# Patient Record
Sex: MALE | Race: WHITE | NOT HISPANIC OR LATINO
[De-identification: names, ages, dates, MRNs, and addresses within clinical notes are randomized per-mention and may not be internally consistent; named-entity substitution may affect disease eponyms.]

---

## 2019-03-13 PROBLEM — Z00.00 ENCOUNTER FOR PREVENTIVE HEALTH EXAMINATION: Status: ACTIVE | Noted: 2019-03-13

## 2019-03-15 ENCOUNTER — APPOINTMENT (OUTPATIENT)
Dept: SURGERY | Facility: CLINIC | Age: 74
End: 2019-03-15
Payer: MEDICARE

## 2019-03-15 VITALS
HEART RATE: 54 BPM | SYSTOLIC BLOOD PRESSURE: 122 MMHG | DIASTOLIC BLOOD PRESSURE: 71 MMHG | TEMPERATURE: 96.6 F | WEIGHT: 185.25 LBS | OXYGEN SATURATION: 96 % | HEIGHT: 72 IN | BODY MASS INDEX: 25.09 KG/M2

## 2019-03-15 DIAGNOSIS — I10 ESSENTIAL (PRIMARY) HYPERTENSION: ICD-10-CM

## 2019-03-15 DIAGNOSIS — R00.2 PALPITATIONS: ICD-10-CM

## 2019-03-15 DIAGNOSIS — E78.5 HYPERLIPIDEMIA, UNSPECIFIED: ICD-10-CM

## 2019-03-15 DIAGNOSIS — R73.03 PREDIABETES.: ICD-10-CM

## 2019-03-15 DIAGNOSIS — K40.90 UNILATERAL INGUINAL HERNIA, W/OUT OBSTRUCTION OR GANGRENE, NOT SPECIFIED AS RECURRENT: ICD-10-CM

## 2019-03-15 PROCEDURE — 99203 OFFICE O/P NEW LOW 30 MIN: CPT

## 2019-03-15 NOTE — HISTORY OF PRESENT ILLNESS
[de-identified] : Pt is a 73 y/o M with pmhx of prediabetes, htn, hld, who presents today feeling well. Pt states he was referred here by his PCP for b/l inguinal hernia evaluation. Pt states that he has had this problem for about 3 mo. States that he is not experiencing pain, denies changes in size or characteristic.

## 2019-03-15 NOTE — PLAN
[FreeTextEntry1] : Pt to be scheduled for b/l inguinal hernia repair\par Pt to obtain medical and cardiac clearance

## 2019-03-15 NOTE — PHYSICAL EXAM
[Normal] : affect appropriate [de-identified] : small-moderate b/l inguinal hernia, no ttp, no erythema

## 2019-05-03 VITALS
RESPIRATION RATE: 16 BRPM | SYSTOLIC BLOOD PRESSURE: 124 MMHG | DIASTOLIC BLOOD PRESSURE: 67 MMHG | WEIGHT: 188.05 LBS | HEIGHT: 72 IN | HEART RATE: 54 BPM | TEMPERATURE: 96 F | OXYGEN SATURATION: 99 %
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
## 2019-05-03 NOTE — ASU PATIENT PROFILE, ADULT - PSH
H/O shoulder surgery    History of surgery    History of surgery  fibula fracture surgery H/O shoulder surgery  left  History of surgery  fibula fracture surgery  History of tonsillectomy

## 2019-05-06 ENCOUNTER — APPOINTMENT (OUTPATIENT)
Age: 74
End: 2019-05-06

## 2019-05-06 ENCOUNTER — OUTPATIENT (OUTPATIENT)
Dept: OUTPATIENT SERVICES | Facility: HOSPITAL | Age: 74
LOS: 1 days | Discharge: HOME | End: 2019-05-06
Payer: MEDICARE

## 2019-05-06 VITALS
RESPIRATION RATE: 17 BRPM | DIASTOLIC BLOOD PRESSURE: 67 MMHG | SYSTOLIC BLOOD PRESSURE: 117 MMHG | OXYGEN SATURATION: 100 % | HEART RATE: 56 BPM

## 2019-05-06 DIAGNOSIS — Z90.89 ACQUIRED ABSENCE OF OTHER ORGANS: Chronic | ICD-10-CM

## 2019-05-06 DIAGNOSIS — Z98.890 OTHER SPECIFIED POSTPROCEDURAL STATES: Chronic | ICD-10-CM

## 2019-05-06 PROCEDURE — C1781: CPT

## 2019-05-06 PROCEDURE — 49650 LAP ING HERNIA REPAIR INIT: CPT | Mod: 50,GC

## 2019-05-06 PROCEDURE — S2900: CPT

## 2019-05-06 PROCEDURE — 49650 LAP ING HERNIA REPAIR INIT: CPT | Mod: 50

## 2019-05-06 PROCEDURE — 49650 LAP ING HERNIA REPAIR INIT: CPT | Mod: 80,50

## 2019-05-06 RX ORDER — SODIUM CHLORIDE 9 MG/ML
3 INJECTION INTRAMUSCULAR; INTRAVENOUS; SUBCUTANEOUS EVERY 8 HOURS
Qty: 0 | Refills: 0 | Status: DISCONTINUED | OUTPATIENT
Start: 2019-05-06 | End: 2019-05-06

## 2019-05-06 RX ORDER — BUPIVACAINE 13.3 MG/ML
20 INJECTION, SUSPENSION, LIPOSOMAL INFILTRATION ONCE
Qty: 0 | Refills: 0 | Status: DISCONTINUED | OUTPATIENT
Start: 2019-05-06 | End: 2019-05-06

## 2019-05-06 RX ORDER — SENNA PLUS 8.6 MG/1
1 TABLET ORAL
Qty: 10 | Refills: 0 | OUTPATIENT
Start: 2019-05-06

## 2019-05-06 RX ORDER — OXYCODONE HYDROCHLORIDE 5 MG/1
5 TABLET ORAL ONCE
Qty: 0 | Refills: 0 | Status: DISCONTINUED | OUTPATIENT
Start: 2019-05-06 | End: 2019-05-06

## 2019-05-06 RX ORDER — FENTANYL CITRATE 50 UG/ML
25 INJECTION INTRAVENOUS
Qty: 0 | Refills: 0 | Status: DISCONTINUED | OUTPATIENT
Start: 2019-05-06 | End: 2019-05-06

## 2019-05-06 RX ORDER — DOCUSATE SODIUM 100 MG
1 CAPSULE ORAL
Qty: 20 | Refills: 0 | OUTPATIENT
Start: 2019-05-06

## 2019-05-06 NOTE — BRIEF OPERATIVE NOTE - NSICDXBRIEFPROCEDURE_GEN_ALL_CORE_FT
PROCEDURES:  Robot-assisted repair of inguinal hernia using da Jose Alfredo Xi 06-May-2019 15:00:04 bilateral   Refugio Yeboah

## 2019-05-06 NOTE — PACU DISCHARGE NOTE - COMMENTS
alert and oriented x4- tolerating po intake well. ambulating to bathroom and hallway from chair with stand by assist- pt denies lightheadedness. Voided 100ml of urine- Team notified and assessed pt tat bedside and per Team-pt may go at present- Discharge instruction explained to pt. Pt verbalizes understanding-pt left unit ambulating to main lobby accompanied by PCA alert and oriented x4- tolerating po intake well. ambulating to bathroom and hallway from chair with stand by assist- pt denies lightheadedness. Voided 100ml of urine- Team notified and assessed pt tat bedside and per Team 2-pt may go at present- Discharge instruction explained to pt. Pt verbalizes understanding-pt left unit ambulating to main lobby accompanied by PCA

## 2019-05-06 NOTE — BRIEF OPERATIVE NOTE - OPERATION/FINDINGS
Emperatriz access, visible right pantaloon and right direct inguinal hernia, reduced primarily with placement of 3d Max mesh in bilateral spaces. Peritoneum closed primarily. All defects covered. Umbilical incision closed primarily with 0 figure of 8 vicryl.

## 2019-05-09 PROBLEM — N40.0 BENIGN PROSTATIC HYPERPLASIA WITHOUT LOWER URINARY TRACT SYMPTOMS: Chronic | Status: ACTIVE | Noted: 2019-05-06

## 2019-05-09 PROBLEM — M72.2 PLANTAR FASCIAL FIBROMATOSIS: Chronic | Status: ACTIVE | Noted: 2019-05-03

## 2019-05-09 PROBLEM — I10 ESSENTIAL (PRIMARY) HYPERTENSION: Chronic | Status: ACTIVE | Noted: 2019-05-03

## 2019-05-09 PROBLEM — K29.70 GASTRITIS, UNSPECIFIED, WITHOUT BLEEDING: Chronic | Status: ACTIVE | Noted: 2019-05-03

## 2019-05-09 PROBLEM — S32.9XXA FRACTURE OF UNSPECIFIED PARTS OF LUMBOSACRAL SPINE AND PELVIS, INITIAL ENCOUNTER FOR CLOSED FRACTURE: Chronic | Status: ACTIVE | Noted: 2019-05-03

## 2019-05-09 PROBLEM — E78.5 HYPERLIPIDEMIA, UNSPECIFIED: Chronic | Status: ACTIVE | Noted: 2019-05-06

## 2019-05-10 ENCOUNTER — APPOINTMENT (OUTPATIENT)
Dept: SURGERY | Facility: CLINIC | Age: 74
End: 2019-05-10
Payer: MEDICARE

## 2019-05-10 VITALS
HEIGHT: 72 IN | SYSTOLIC BLOOD PRESSURE: 151 MMHG | WEIGHT: 187.5 LBS | DIASTOLIC BLOOD PRESSURE: 80 MMHG | BODY MASS INDEX: 25.4 KG/M2 | TEMPERATURE: 97.1 F | HEART RATE: 60 BPM | OXYGEN SATURATION: 97 %

## 2019-05-10 PROCEDURE — 99024 POSTOP FOLLOW-UP VISIT: CPT

## 2019-05-10 NOTE — HISTORY OF PRESENT ILLNESS
[de-identified] : 75 yo M 5 days s/p b/l hernia repair. Pt reports feeling well. Incisions also healing well. Drain was removed in office. Denies any fever or chills. He states that he has been urinating frequently with normal amounts of urine and feels bloated after meals. He does add that recently it has been improving over the past few days. No other complaints today.

## 2019-05-10 NOTE — ASSESSMENT
[FreeTextEntry1] : 75 yo M 5 days s/p b/l hernia repair. Pt is doing well. Incisions are healing well. Drain removed without complications. Pt advised to see his urologist if his frequent urination becomes a problem. Follow up in 3 weeks.

## 2019-05-31 ENCOUNTER — APPOINTMENT (OUTPATIENT)
Dept: SURGERY | Facility: CLINIC | Age: 74
End: 2019-05-31
Payer: MEDICARE

## 2019-05-31 VITALS
DIASTOLIC BLOOD PRESSURE: 76 MMHG | BODY MASS INDEX: 25.87 KG/M2 | WEIGHT: 191 LBS | SYSTOLIC BLOOD PRESSURE: 133 MMHG | HEIGHT: 72 IN | TEMPERATURE: 97.8 F | OXYGEN SATURATION: 98 % | HEART RATE: 54 BPM

## 2019-05-31 PROCEDURE — 99024 POSTOP FOLLOW-UP VISIT: CPT

## 2019-05-31 NOTE — ASSESSMENT
[FreeTextEntry1] : 75 yo M 26 days s/p b/l hernia repair. Patient is doing well. Encouraged patient to stretch and walk often to increase range of motion on his legs. Advised patient to begin strength training slowly and gradually increasing weights he lifts over time. Follow up here as needed.

## 2019-05-31 NOTE — ADDENDUM
[FreeTextEntry1] : Documented by Marilynn Jackson acting as a scribe for Dr. Marc Waddell on 05/31/2019\par

## 2019-05-31 NOTE — HISTORY OF PRESENT ILLNESS
[de-identified] : 75 yo M 26 days s/p b/l hernia repair. Pt reports feeling well. He does report presence of some lumps,swelling, as well as itchiness around the incision site. Pt looking to begin strength training exercise. No other complaints today.

## 2019-05-31 NOTE — END OF VISIT
[FreeTextEntry3] : All medical record entries made by the Scribe were at my, Dr. Waddell's direction and personally dictated by me on 05/31/2019  I have reviewed the chart and agree that the record accurately reflects my personal performance of the history, physical exam, assessment and plan. I have also personally directed, reviewed, and agreed with the chart.\par \par

## 2020-02-19 ENCOUNTER — APPOINTMENT (OUTPATIENT)
Dept: ORTHOPEDIC SURGERY | Facility: CLINIC | Age: 75
End: 2020-02-19
Payer: MEDICARE

## 2020-02-19 DIAGNOSIS — Z86.79 PERSONAL HISTORY OF OTHER DISEASES OF THE CIRCULATORY SYSTEM: ICD-10-CM

## 2020-02-19 DIAGNOSIS — M25.512 PAIN IN LEFT SHOULDER: ICD-10-CM

## 2020-02-19 DIAGNOSIS — S49.92XA UNSPECIFIED INJURY OF LEFT SHOULDER AND UPPER ARM, INITIAL ENCOUNTER: ICD-10-CM

## 2020-02-19 PROCEDURE — 20611 DRAIN/INJ JOINT/BURSA W/US: CPT | Mod: LT

## 2020-02-19 PROCEDURE — 99204 OFFICE O/P NEW MOD 45 MIN: CPT | Mod: 25

## 2020-02-19 PROCEDURE — 76881 US COMPL JOINT R-T W/IMG: CPT | Mod: 59,LT

## 2020-02-19 RX ORDER — TRAMADOL HYDROCHLORIDE 50 MG/1
50 TABLET, COATED ORAL
Qty: 60 | Refills: 0 | Status: ACTIVE | COMMUNITY
Start: 2020-02-19 | End: 1900-01-01

## 2020-02-19 NOTE — HISTORY OF PRESENT ILLNESS
[de-identified] : LEFT SHOULDER\par 2/17/20- FALL\par PREVIOUS LEFT SHOULDER SURGERY 30 YEARS AGO\par 1980s - LEFT SHOULDER OPEN SURGERY WITH STAPLE \par PAIN LEVEL 6/10\par CONSTANT PAIN\par DULL\par BETTER WITH HEAT, IBUPROFEN\par WORSE WITH LIFTING REACHING ACROSS AND BEHIND, LYING DOWN\par STIFFNESS\par GRINDING\par RADIATING DOWN ARM\par WEAKNESS

## 2020-02-19 NOTE — PROCEDURE
[de-identified] : DIAGNOSTIC ULTRASOUND LEFT SHOULDER\par \par DIAGNOSTIC SONOGRAPHY of the Rotator Cuff Soft Tissue of the LEFT  SHOULDER was performed in Multiple Scan Planes with varying transducer frequencies.\par Imaging of the Supraspinatus Tendon reveals TENDONITIS, BURSITIS, ARTICULAR SIDE DEGENERATION  WITH OUT  COMPLETE TEAR\par Imaging of the Biceps Tendon reveals no significant tear.\par Imaging of the Subscapularis Tendon reveals no significant tear.\par Imaging of the Infraspinatus Tendon reveals no significant tear.\par Key images were save digitally and reviewed with patient.\par \par \par \par INJECTION LEFT SHOULDER SA SPACE\par \par Patient has demonstrated limited relief from NSAIDS, rest, exercises / PT, and after discussion of the risks and benefits, the patient has elected to proceed with an ULTRASOUND GUIDED injection into the LEFT SUBACROMIAL  SPACE LATERAL APPROACH \par  \par Confirmed that the patient does not have history of prior adverse reactions, active, infections, or relevant allergies. There was no effusion, erythema, or warmth, and the skin was clear\par \par The skin was sterilized with alcohol. Ethyl Chloride was used as a topical anesthetic. Routine sterile technique. \par The site was injected UTILIZING ULTRASOUND GUIDANCE to confirm appropriate placement of the needle-\par with a mixture of medication and local anesthetic. The injection was completed without complication and a bandage was applied.\par  \par The patient tolerated the procedure well and was given post-injection instructions.Rec: Cold therapy, analgesics, avoid heavy activity.\par MEDICATION: 4cc of 1% xylocaine + 10mg of KENALOG\par \par

## 2020-02-19 NOTE — DISCUSSION/SUMMARY
[de-identified] : POST INJECTION INSTRUCTIONS\par \par COLD THERAPY , ANALGESICS PRN\par \par HOUSEHOLD ACTIVITIES AND LIGHT WORK\par \par FU AFTER MRI\par

## 2020-02-19 NOTE — PHYSICAL EXAM
[de-identified] : PHYSICAL EXAM LEFT  SHOULDER\par \par SCAPULAR PROTRACTION\par AROM 40 / 40 \par TENDER: SA REGION \par \par SPECIAL TESTING :\par NGUYEN - POSITIVE \par COLLETTE - POSITIVE \par SPEED TEST - POSITIVE\par \par BOYLE - NEGATIVE \par APPREHENSION AND SUPPRESSION - NEGATIVE \par \par RC STRENGTH TESTING \par SS:  5/5\par SUB 5/5\par IS     5/5\par BICEPS  5/5\par \par SENSATION  - GROSSLY INTACT\par \par \par  [de-identified] : outside films \par LEFT SHOULDER XRAY -  \par NO OBVIOUS FRACTURE , SEPARATION OR DISLOCATION \par GRADE 3 OSTEOARTHRITIS, \par TYPE 2B ACROMION \par \par

## 2020-04-15 ENCOUNTER — APPOINTMENT (OUTPATIENT)
Dept: ORTHOPEDIC SURGERY | Facility: CLINIC | Age: 75
End: 2020-04-15

## 2023-02-10 NOTE — ASU PATIENT PROFILE, ADULT - MEDICATION ADMINISTRATION INFO, PROFILE
Moderate episode of recurrent major depressive disorder (HCC)Noted 12/29/2022  [F33 1]      NOT on the BPA- please assess using MEAT for 2023 billing    [facility] [address]  coding opportunities          Chart Reviewed number of suggestions sent to Provider: 1     Patients Insurance        Commercial Insurance: [employer]
no concerns

## 2023-06-26 LAB
ANION GAP IN SER/PLAS: 13 MMOL/L (ref 10–20)
CALCIUM (MG/DL) IN SER/PLAS: 9.8 MG/DL (ref 8.6–10.3)
CARBON DIOXIDE, TOTAL (MMOL/L) IN SER/PLAS: 27 MMOL/L (ref 21–32)
CHLORIDE (MMOL/L) IN SER/PLAS: 101 MMOL/L (ref 98–107)
CREATININE (MG/DL) IN SER/PLAS: 1.2 MG/DL (ref 0.5–1.3)
ERYTHROCYTE DISTRIBUTION WIDTH (RATIO) BY AUTOMATED COUNT: 13.8 % (ref 11.5–14.5)
ERYTHROCYTE MEAN CORPUSCULAR HEMOGLOBIN CONCENTRATION (G/DL) BY AUTOMATED: 33.3 G/DL (ref 32–36)
ERYTHROCYTE MEAN CORPUSCULAR VOLUME (FL) BY AUTOMATED COUNT: 94 FL (ref 80–100)
ERYTHROCYTES (10*6/UL) IN BLOOD BY AUTOMATED COUNT: 4.81 X10E12/L (ref 4.5–5.9)
GFR MALE: 63 ML/MIN/1.73M2
GLUCOSE (MG/DL) IN SER/PLAS: 133 MG/DL (ref 74–99)
HEMATOCRIT (%) IN BLOOD BY AUTOMATED COUNT: 45.1 % (ref 41–52)
HEMOGLOBIN (G/DL) IN BLOOD: 15 G/DL (ref 13.5–17.5)
LEUKOCYTES (10*3/UL) IN BLOOD BY AUTOMATED COUNT: 5.6 X10E9/L (ref 4.4–11.3)
PLATELETS (10*3/UL) IN BLOOD AUTOMATED COUNT: 201 X10E9/L (ref 150–450)
POTASSIUM (MMOL/L) IN SER/PLAS: 4.4 MMOL/L (ref 3.5–5.3)
SODIUM (MMOL/L) IN SER/PLAS: 137 MMOL/L (ref 136–145)
UREA NITROGEN (MG/DL) IN SER/PLAS: 11 MG/DL (ref 6–23)

## 2023-12-06 DIAGNOSIS — G62.9 NEUROPATHY: Primary | ICD-10-CM

## 2023-12-06 RX ORDER — GABAPENTIN 300 MG/1
300 CAPSULE ORAL 2 TIMES DAILY
COMMUNITY
Start: 2014-11-07 | End: 2023-12-06 | Stop reason: SDUPTHER

## 2023-12-06 RX ORDER — GABAPENTIN 300 MG/1
300 CAPSULE ORAL 2 TIMES DAILY
Qty: 180 CAPSULE | Refills: 0 | Status: SHIPPED | OUTPATIENT
Start: 2023-12-06 | End: 2024-02-28

## 2023-12-15 ENCOUNTER — OFFICE VISIT (OUTPATIENT)
Dept: PRIMARY CARE | Facility: CLINIC | Age: 78
End: 2023-12-15
Payer: MEDICARE

## 2023-12-15 VITALS
RESPIRATION RATE: 16 BRPM | TEMPERATURE: 97.5 F | HEART RATE: 96 BPM | DIASTOLIC BLOOD PRESSURE: 80 MMHG | OXYGEN SATURATION: 98 % | SYSTOLIC BLOOD PRESSURE: 122 MMHG

## 2023-12-15 DIAGNOSIS — H34.232 RETINAL ARTERY BRANCH OCCLUSION OF LEFT EYE: Primary | ICD-10-CM

## 2023-12-15 PROCEDURE — 1159F MED LIST DOCD IN RCRD: CPT | Performed by: FAMILY MEDICINE

## 2023-12-15 PROCEDURE — 99213 OFFICE O/P EST LOW 20 MIN: CPT | Performed by: FAMILY MEDICINE

## 2023-12-15 RX ORDER — SILDENAFIL 100 MG/1
100 TABLET, FILM COATED ORAL DAILY PRN
COMMUNITY
Start: 2019-06-03

## 2023-12-15 RX ORDER — ASPIRIN 81 MG/1
1 TABLET ORAL DAILY
COMMUNITY
Start: 2021-10-01

## 2023-12-15 RX ORDER — LEVOTHYROXINE SODIUM 25 UG/1
25 TABLET ORAL DAILY
COMMUNITY
Start: 2016-01-25

## 2023-12-15 RX ORDER — LAMOTRIGINE 25 MG/1
50 TABLET, CHEWABLE ORAL 2 TIMES DAILY
COMMUNITY

## 2023-12-15 NOTE — PROGRESS NOTES
Subjective   Patient ID: Oscar Lomeli is a 78 y.o. male who presents for Eye Problem.  HPI  Patient presenting for follow up stating danielle the saw an eye specilaist and they watned to see his PCP   He is not sure who is saw and he forgot the paper at home.   When he got the paper it was soraya hamilton to go to the ED for retinal artery occulsion.     Review of Systems    No past medical history on file.    No past surgical history on file.   Social History     Socioeconomic History    Marital status:      Spouse name: Not on file    Number of children: Not on file    Years of education: Not on file    Highest education level: Not on file   Occupational History    Not on file   Tobacco Use    Smoking status: Not on file    Smokeless tobacco: Not on file   Substance and Sexual Activity    Alcohol use: Not on file    Drug use: Not on file    Sexual activity: Not on file   Other Topics Concern    Not on file   Social History Narrative    Not on file     Social Determinants of Health     Financial Resource Strain: Not on file   Food Insecurity: Not on file   Transportation Needs: Not on file   Physical Activity: Not on file   Stress: Not on file   Social Connections: Not on file   Intimate Partner Violence: Not on file   Housing Stability: Not on file      No family history on file.    MEDICATIONS AND ALLERGIES:    ALLERGIES Patient has no known allergies.    MEDICATIONS   Current Outpatient Medications on File Prior to Visit   Medication Sig Dispense Refill    aspirin 81 mg EC tablet Take 1 tablet (81 mg) by mouth once daily.      levothyroxine (Synthroid, Levoxyl) 25 mcg tablet Take 1 tablet (25 mcg) by mouth once daily.      sildenafil (Viagra) 100 mg tablet Take 1 tablet (100 mg) by mouth once daily as needed.      gabapentin (Neurontin) 300 mg capsule Take 1 capsule (300 mg) by mouth 2 times a day. 180 capsule 0    lamoTRIgine (LaMICtal) 25 mg chewable tablet Take 2 tablets (50 mg) by mouth 2 times a day.       omeprazole (PriLOSEC) 20 mg DR capsule TAKE 1 CAPSULE BY MOUTH EVERY DAY 90 capsule 0     No current facility-administered medications on file prior to visit.        Objective   Visit Vitals  /80   Pulse 96   Temp 36.4 °C (97.5 °F)   Resp 16   SpO2 98%      Physical Exam  Constitutional:       Appearance: Normal appearance.   HENT:      Head: Normocephalic and atraumatic.   Eyes:      Pupils: Pupils are equal, round, and reactive to light.   Cardiovascular:      Rate and Rhythm: Normal rate.   Pulmonary:      Effort: Pulmonary effort is normal.   Musculoskeletal:         General: No swelling.      Cervical back: Normal range of motion.   Skin:     Coloration: Skin is not jaundiced.   Neurological:      General: No focal deficit present.      Mental Status: He is alert and oriented to person, place, and time.         1. 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

## 2023-12-21 ENCOUNTER — PATIENT OUTREACH (OUTPATIENT)
Dept: CARE COORDINATION | Facility: CLINIC | Age: 78
End: 2023-12-21
Payer: MEDICARE

## 2023-12-21 NOTE — PROGRESS NOTES
Discharge Facility:[facility]   Discharge Diagnosis:Non-Traumatic subacute subdural hemorrhage  Admission Date:12/18/2023  Discharge Date: 12/19/2023    PCP Appointment Date:TBD  Specialist Appointment Date: 1/17/2024/Cardiology  Hospital Encounter and Summary:  not available at this time   See discharge assessment below for further details  Engagement  Call Start Time: 1720 (12/21/2023 11:19 AM)    Medications  Medications reviewed with patient/caregiver?: Yes (No medication change) (12/21/2023 11:19 AM)  Is the patient having any side effects they believe may be caused by any medication additions or changes?: No (12/21/2023 11:19 AM)  Does the patient have all medications ordered at discharge?: Not applicable (12/21/2023 11:19 AM)  Care Management Interventions: No intervention needed (12/21/2023 11:19 AM)  Is the patient taking all medications as directed (includes completed medication regime)?: Yes (12/21/2023 11:19 AM)    Appointments  Does the patient have a primary care provider?: Yes (12/21/2023 11:19 AM)  Care Management Interventions: -- (Patient states that he will make the follow up appointment with pcp.) (12/21/2023 11:19 AM)  Has the patient kept scheduled appointments due by today?: Yes (12/21/2023 11:19 AM)    Patient Teaching  Does the patient have access to their discharge instructions?: Yes (12/21/2023 11:19 AM)  What is the patient's perception of their health status since discharge?: Improving (Oscar states that he is doing well, no concerns.) (12/21/2023 11:19 AM)  Is the patient/caregiver able to teach back the hierarchy of who to call/visit for symptoms/problems? PCP, Specialist, Home Health nurse, Urgent Care, ED, 911: Yes (12/21/2023 11:19 AM)    Wrap Up  Wrap Up Additional Comments: -- (Oscar says he is doing ok, no headaches or symptoms. He will fu with pcp and cardio.) (12/21/2023 11:19 AM)

## 2023-12-22 ENCOUNTER — OFFICE VISIT (OUTPATIENT)
Dept: PRIMARY CARE | Facility: CLINIC | Age: 78
End: 2023-12-22
Payer: MEDICARE

## 2023-12-22 VITALS
SYSTOLIC BLOOD PRESSURE: 122 MMHG | HEIGHT: 66 IN | HEART RATE: 70 BPM | DIASTOLIC BLOOD PRESSURE: 80 MMHG | TEMPERATURE: 97.8 F | WEIGHT: 176 LBS | BODY MASS INDEX: 28.28 KG/M2

## 2023-12-22 DIAGNOSIS — F10.10 ALCOHOL ABUSE: ICD-10-CM

## 2023-12-22 DIAGNOSIS — H34.232 RETINAL ARTERY BRANCH OCCLUSION OF LEFT EYE: Primary | ICD-10-CM

## 2023-12-22 DIAGNOSIS — I63.81 LACUNAR INFARCT, ACUTE (MULTI): ICD-10-CM

## 2023-12-22 DIAGNOSIS — J44.9 CHRONIC OBSTRUCTIVE PULMONARY DISEASE, UNSPECIFIED COPD TYPE (MULTI): ICD-10-CM

## 2023-12-22 PROCEDURE — 1036F TOBACCO NON-USER: CPT | Performed by: INTERNAL MEDICINE

## 2023-12-22 PROCEDURE — 1159F MED LIST DOCD IN RCRD: CPT | Performed by: INTERNAL MEDICINE

## 2023-12-22 PROCEDURE — 99496 TRANSJ CARE MGMT HIGH F2F 7D: CPT | Performed by: INTERNAL MEDICINE

## 2023-12-22 RX ORDER — ATORVASTATIN CALCIUM 80 MG/1
40 TABLET, FILM COATED ORAL EVERY EVENING
COMMUNITY
Start: 2023-12-19

## 2023-12-22 RX ORDER — LOSARTAN POTASSIUM 25 MG/1
25 TABLET ORAL DAILY
COMMUNITY
Start: 2022-02-25 | End: 2023-12-22 | Stop reason: ALTCHOICE

## 2023-12-22 RX ORDER — FLUTICASONE PROPIONATE AND SALMETEROL XINAFOATE 230; 21 UG/1; UG/1
2 AEROSOL, METERED RESPIRATORY (INHALATION) EVERY 12 HOURS
COMMUNITY
Start: 2017-06-12

## 2023-12-22 RX ORDER — MELOXICAM 15 MG/1
15 TABLET ORAL EVERY 24 HOURS
COMMUNITY
Start: 2016-12-16

## 2023-12-22 RX ORDER — CLOPIDOGREL BISULFATE 75 MG/1
75 TABLET ORAL DAILY
COMMUNITY
Start: 2023-12-19

## 2023-12-22 RX ORDER — MONTELUKAST SODIUM 10 MG/1
10 TABLET ORAL EVERY 24 HOURS
COMMUNITY
Start: 2017-09-13 | End: 2023-12-22 | Stop reason: ALTCHOICE

## 2023-12-22 NOTE — PROGRESS NOTES
"Subjective   Patient ID: Oscar Lomeli is a 78 y.o. male who presents for Hospital Follow-up (Hospital follow up, stroke ).    HPI TCM  SP CVA   DOING OK AT HOME   NO FURTHER SYMPTOMS  FEELS WELL   WE DID REVIEW CHART   MEDS   TESTING     Review of Systems  COPD  \ETOH ABUSE   HTN     Objective   /80   Pulse 70   Temp 36.6 °C (97.8 °F)   Ht 1.676 m (5' 6\")   Wt 79.8 kg (176 lb)   BMI 28.41 kg/m²     Physical Exam  Constitutional:       Appearance: Normal appearance. He is normal weight.   HENT:      Head: Normocephalic.      Right Ear: External ear normal.      Left Ear: External ear normal.      Nose: Nose normal.   Cardiovascular:      Rate and Rhythm: Normal rate and regular rhythm.      Pulses: Normal pulses.   Pulmonary:      Effort: Pulmonary effort is normal.      Breath sounds: Normal breath sounds.   Musculoskeletal:      Cervical back: Normal range of motion.   Neurological:      Mental Status: He is alert.         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

## 2024-01-08 ENCOUNTER — PATIENT OUTREACH (OUTPATIENT)
Dept: PRIMARY CARE | Facility: CLINIC | Age: 79
End: 2024-01-08
Payer: MEDICARE

## 2024-01-08 RX ORDER — GUAIFENESIN/DEXTROMETHORPHAN 100-10MG/5
5 SYRUP ORAL 4 TIMES DAILY PRN
COMMUNITY

## 2024-01-08 RX ORDER — FOLIC ACID 1 MG/1
1 TABLET ORAL DAILY
COMMUNITY

## 2024-01-08 RX ORDER — LANOLIN ALCOHOL/MO/W.PET/CERES
100 CREAM (GRAM) TOPICAL DAILY
COMMUNITY

## 2024-01-08 NOTE — PROGRESS NOTES
[facility] Facility:[facility]  Discharge Diagnosis:Dizziness  Admission Date:12/29/2023  Discharge Date: 1/6/2024    PCP Appointment Date:2/12/2024, for MCR, declined sooner  Specialist Appointment Date: 1/17/2024 Cardiology  Hospital Encounter and Summary:  not available at this time  See discharge assessment below for further details  Engagement  Call Start Time: 1455 (1/8/2024  2:56 PM)    Medications  Medications reviewed with patient/caregiver?: Yes (Plavix 75 mg one qd, Atorvastatin 40 mg one q hs, Folic Acid 1 mg one qd, Robitussin /10/5 ml  5ml q4 hours prn, Thiamine 100 mg one qd) (1/8/2024  2:56 PM)  Is the patient having any side effects they believe may be caused by any medication additions or changes?: No (1/8/2024  2:56 PM)  Does the patient have all medications ordered at discharge?: Yes (1/8/2024  2:56 PM)  Care Management Interventions: No intervention needed (1/8/2024  2:56 PM)  Is the patient taking all medications as directed (includes completed medication regime)?: Yes (1/8/2024  2:56 PM)    Appointments  Does the patient have a primary care provider?: Yes (1/8/2024  2:56 PM)  Care Management Interventions: -- (Has apppointment 2/12/2024, states will keep that.) (1/8/2024  2:56 PM)  Has the patient kept scheduled appointments due by today?: Yes (1/8/2024  2:56 PM)    Patient Teaching  Does the patient have access to their discharge instructions?: Yes (1/8/2024  2:56 PM)  What is the patient's perception of their health status since discharge?: Improving (1/8/2024  2:56 PM)  Is the patient/caregiver able to teach back the hierarchy of who to call/visit for symptoms/problems? PCP, Specialist, Home Health nurse, Urgent Care, ED, 911: Yes (1/8/2024  2:56 PM)    Wrap Up  Wrap Up Additional Comments: -- (Oscar states he is doing well. Encouraged to schedule sooner with PCP , states has other appts so will keep. He is taking meds as directed and will contact provider if any concerns.)  (1/8/2024  2:56 PM)

## 2024-01-11 ENCOUNTER — APPOINTMENT (OUTPATIENT)
Dept: ORTHOPEDIC SURGERY | Facility: CLINIC | Age: 79
End: 2024-01-11
Payer: MEDICARE

## 2024-01-11 PROCEDURE — 99203 OFFICE O/P NEW LOW 30 MIN: CPT

## 2024-01-11 NOTE — PHYSICAL EXAM
[de-identified] : PHYSICAL EXAM LEFT  SHOULDER  NECK EXAM  FROM NONTENDER  SPURLING  RIGHT=NEG, LEFT=NEG  NORMAL POSTURE  AROM 105  / 95 / 70 / 0 TENDER: GH JOINT  SPECIAL TESTING : NGUYEN - POSITIVE  COLLETTE - POSITIVE  SPEED TEST - POSITIVE  BOYLE - NEGATIVE  APPREHENSION AND SUPPRESSION - NEGATIVE   RC STRENGTH TESTING  SS:  5/5 SUB 5/5 IS     5/5 BICEPS  5/5  SENSATION  - GROSSLY INTACT   [de-identified] : Date of Exam: 03- EXAM:  MRI LEFT SHOULDER WITHOUT CONTRAST  IMPRESSION:  1. Severe glenohumeral arthrosis. 2. A large osteophyte which projects from the inferior humeral articular margin of the axillary pouch appears to have a nondisplaced fracture at its base. This may be the source for the patient's posttraumatic pain. 2. No other fractures are seen. 3. Mild to moderate AC joint arthrosis. 4. Study negative for significant subacromial enthesophyte formation. 5. Study negative for partial or full-thickness rotator cuff tear. 6. Circumferential degeneration and fraying of the glenoid labrum in this patient with severe glenohumeral arthrosis.

## 2024-01-11 NOTE — HISTORY OF PRESENT ILLNESS
[de-identified] : LEFT SHOULDER PAIN  FOLLOW UP  PAIN LEVEL: 2/10 , AT REST  PREVIOUS CORTISONE INJ-HELPFUL FOR 2 WEEKS PT CONTINUED DOING P.T  1980S - LEFT SHOULDER OPEN SURGERY WITH STAPLES PRIOR STUDES: MRI FROM 03/06/2020    PREVIOUS HPI LEFT SHOULDER  2/17/20- FALL  PREVIOUS LEFT SHOULDER SURGERY 30 YEARS AGO  1980s - LEFT SHOULDER OPEN SURGERY WITH STAPLE  PAIN LEVEL 6/10  CONSTANT PAIN  DULL  BETTER WITH HEAT, IBUPROFEN  WORSE WITH LIFTING REACHING ACROSS AND BEHIND, LYING DOWN  STIFFNESS  GRINDING  RADIATING DOWN ARM  WEAKNESS

## 2024-02-01 ENCOUNTER — APPOINTMENT (OUTPATIENT)
Dept: ORTHOPEDIC SURGERY | Facility: CLINIC | Age: 79
End: 2024-02-01
Payer: MEDICARE

## 2024-02-01 PROCEDURE — 99213 OFFICE O/P EST LOW 20 MIN: CPT

## 2024-02-01 NOTE — HISTORY OF PRESENT ILLNESS
[de-identified] : LEFT SHOULDER  FOLLOW UP CT RESULTS   PREVIOUS HPI LEFT SHOULDER 2/17/20- FALL PREVIOUS LEFT SHOULDER SURGERY 30 YEARS AGO 1980s - LEFT SHOULDER OPEN SURGERY WITH STAPLE PAIN LEVEL 6/10 CONSTANT PAIN DULL BETTER WITH HEAT, IBUPROFEN WORSE WITH LIFTING REACHING ACROSS AND BEHIND, LYING DOWN STIFFNESS GRINDING RADIATING DOWN ARM WEAKNESS LEFT SHOULDER PAIN  FOLLOW UP  PAIN LEVEL: 2/10 , AT REST  PREVIOUS CORTISONE INJ-HELPFUL FOR 2 WEEKS PT CONTINUED DOING P.T  1980S - LEFT SHOULDER OPEN SURGERY WITH STAPLES PRIOR STUDES: MRI FROM 03/06/2020

## 2024-02-01 NOTE — DISCUSSION/SUMMARY
[de-identified] : PREOP SHOULDER SURGERY DISCUSSION:  PROCEDURE DISCUSSED - QUESTIONS ANSWERED PATIENT WISHES TO PROCEED  POST OP CARE AND LIMITATIONS REVIEWED - HANDOUT PROVIDED   COLD PACKS RECOMMENDED ANALGESICS AND  ANTI NAUSEA MEDS PRESCRIBED  COLACE RECOMMENDED   THERE ARE NO GUARANTEES THAT ALL SYMPTOMS WILL BE ALLEVIATED   SHOULDER ARTHROSCOPY, ACROMIOPLASTY, DEBRIDEMENT,  RC REPAIR AND LABRUM REPAIRS- ON AVERAGE 75- 85% SATISFACTORY RESULTS FOR TEARS < 3CM AFTER 9-12 MONTHS HEALING AND REHABILITATION.   REPAIRS WILL REQUIRE STRICT SHOULDER IMMOBILIZER 4-6 WEEKS  RC TEARS 3CM OR LARGER MAY REQUIRE COLLAGEN PATCH AUGMENTATION GENERALLY HAVE LESS SATISFACTORY RESULTS  PHYSICAL THERAPY REQUIRED 2X WEEK FOR  MINIMUM 8-12 WEEKS FOR ALL PROCEDURES  CONTINUED HOME EXERCISES 6-9 MONTHS AFTER THAT REQUIRED FOR OPTIMAL OUTCOMES   ROUTINE SURGICAL AND ANESTHETIC RISKS INCLUDE RISK OF SURGICAL INFECTION, ANESTHETIC COMPLICATION OR ALLERGY, POSSIBLE RETEARS OR PROGRESSION OF TEAR, STIFFNESS OF SHOULDER AND UNSATISFACTORY OUTCOMES  PATIENT UNDERSTANDS AND WISHES TO PROCEED

## 2024-02-01 NOTE — PHYSICAL EXAM
[de-identified] : PHYSICAL EXAM LEFT  SHOULDER   NORMAL POSTURE  AROM 105  / 95 / 70 / 0 TENDER: GH JOINT  SPECIAL TESTING : NGUYEN - POSITIVE  COLLETTE - POSITIVE  SPEED TEST - POSITIVE  BOYLE - NEGATIVE  APPREHENSION AND SUPPRESSION - NEGATIVE   RC STRENGTH TESTING  SS:  5/5 SUB 5/5 IS     5/5 BICEPS  5/5  SENSATION  - GROSSLY INTACT   [de-identified] : Date of Exam: 01-   EXAM:  CT LEFT SHOULDER WITHOUT CONTRAST  IMPRESSION:  Severe glenohumeral degenerative arthrosis.

## 2024-02-28 DIAGNOSIS — G62.9 NEUROPATHY: ICD-10-CM

## 2024-02-28 RX ORDER — GABAPENTIN 300 MG/1
300 CAPSULE ORAL 2 TIMES DAILY
Qty: 180 CAPSULE | Refills: 0 | Status: SHIPPED | OUTPATIENT
Start: 2024-02-28 | End: 2024-06-03

## 2024-03-01 ENCOUNTER — PATIENT OUTREACH (OUTPATIENT)
Dept: PRIMARY CARE | Facility: CLINIC | Age: 79
End: 2024-03-01
Payer: MEDICARE

## 2024-03-01 NOTE — PROGRESS NOTES
Unable to reach patient for call back after patient's follow up appointment with PCP.   ESAM with call back number for patient to call if needed   If no voicemail available call attempts x 2 were made to contact the patient to assist with any questions or concerns patient may have.

## 2024-03-06 ENCOUNTER — APPOINTMENT (OUTPATIENT)
Dept: ORTHOPEDIC SURGERY | Facility: CLINIC | Age: 79
End: 2024-03-06
Payer: MEDICARE

## 2024-03-06 DIAGNOSIS — M19.012 PRIMARY OSTEOARTHRITIS, LEFT SHOULDER: ICD-10-CM

## 2024-03-06 PROCEDURE — 99213 OFFICE O/P EST LOW 20 MIN: CPT

## 2024-03-06 RX ORDER — DOXYCYCLINE HYCLATE 100 MG/1
100 TABLET ORAL TWICE DAILY
Qty: 14 | Refills: 0 | Status: ACTIVE | COMMUNITY
Start: 2024-03-06 | End: 1900-01-01

## 2024-03-06 RX ORDER — ONDANSETRON 8 MG/1
8 TABLET, ORALLY DISINTEGRATING ORAL 3 TIMES DAILY
Qty: 15 | Refills: 4 | Status: ACTIVE | COMMUNITY
Start: 2024-03-06 | End: 1900-01-01

## 2024-03-06 RX ORDER — OXYCODONE AND ACETAMINOPHEN 7.5; 325 MG/1; MG/1
7.5-325 TABLET ORAL
Qty: 42 | Refills: 0 | Status: ACTIVE | COMMUNITY
Start: 2024-03-06 | End: 1900-01-01

## 2024-03-06 NOTE — HISTORY OF PRESENT ILLNESS
[de-identified] : LEFT SHOULDER  FOLLOW UP MARCH 12, 2024- LEFT REVERSE  SHOULDER ARTHROPLASTY, ROT CUFF REPAIR, BICEPS TENODESIS  - [facility] - MARCH 12 ICE AND MEDIACTION AS NEEDED   PREVIOUS HPI LEFT SHOULDER 2/17/20- FALL PREVIOUS LEFT SHOULDER SURGERY 30 YEARS AGO 1980s - LEFT SHOULDER OPEN SURGERY WITH STAPLE PAIN LEVEL 6/10 CONSTANT PAIN DULL BETTER WITH HEAT, IBUPROFEN WORSE WITH LIFTING REACHING ACROSS AND BEHIND, LYING DOWN STIFFNESS GRINDING RADIATING DOWN ARM WEAKNESS LEFT SHOULDER PAIN  FOLLOW UP  PAIN LEVEL: 2/10 , AT REST  PREVIOUS CORTISONE INJ-HELPFUL FOR 2 WEEKS PT CONTINUED DOING P.T  1980S - LEFT SHOULDER OPEN SURGERY WITH STAPLES PRIOR STUDES: MRI FROM 03/06/2020

## 2024-03-06 NOTE — DISCUSSION/SUMMARY
[de-identified] : Using a shoulder VIDEO , I reviewed the anatomy of the shoulder and the definition of shoulder arthritis, characterized by the loss of articular cartilage resulting in bone-on-bone grinding, shoulder stiffness and pain. I reviewed the full nonoperative program of treating shoulder arthritis which includes activity modification with avoidance of those maneuvers that exacerbate pain, anti-inflammatory medications for those patients who can tolerate these medicines, and a gentle exercise program that does not increase pain. The success of nonoperative treatment is dependent on the extent of arthritic changes in the shoulder. I explained that for those patients in whom nonoperative treatment is unsuccessful, shoulder replacement is in excellent treatment for relief of pain and return of shoulder function. I reviewed that the indication for shoulder replacement is intractable pain, unresponsive to nonoperative treatment, that compromises activities of daily living and recreation.   The procedure is performed under a regional scalene block anesthesia. I reviewed that a six-inch anterior surgical incision is utilized as well as the details of replacing the ball and socket of the shoulder joint. I explained that the vast majority of patients with osteoarthritis of the glenohumeral joint have intact rotator cuff tendons, and I recommend non-constrained shoulder arthroplasty in such cases. However, if at the time of surgery, it is discovered that the rotator cuff tendons are torn or severely attenuated, I would then proceed with a different design of shoulder replacement that is indicated in such cases, i.e. a reverse shoulder replacement. I then reviewed the details and design principles of reve rse shoulder arthroplasty. Blood loss for either procedure is rarely sufficient to require a transfusion. Patients who do not have serious medical co-morbidities, are stable in the recovery room, and have help at home may be discharged from the hospital on the day of surgery. Those patients with serious medical co-morbidities or who live alone require an overnight stay in the hospital and are discharged the following day.  The immediate postoperative recovery was reviewed in detail, and I instructed the patient in sling management and gave a printed instruction sheet. I also reviewed activities of daily living that may be performed the day after surgery so that they can maintain independence in self-care. Active use of the hand of the operated arm between the side pocket and nose is permitted so that patients can wash hands, button a shirt and dress themselves. Finally, I referred patients to a You Tube site where these activities can be viewed on a video.  Clinical improvement proceeds for a total of 9-12 months postoperatively before final outcome can be assessed. For both anatomic and reverse total shoulder replacements, approximately 90% of patients achieve excellent pain relief and return of overhead function. I informed the patient that clinical outcome forms will be completed pre-operatively, 6 and 12 months post-operatively, and annually thereafter.  I then summarized the indications for surgery, the nature of the surgical procedure itself, the alternative methods of treatment, and the possible complications including instability, loosening of the components, nerve injury, and infection. I made sure that these issues were understood and then answered all questions.

## 2024-03-06 NOTE — PHYSICAL EXAM
[de-identified] : PHYSICAL EXAM LEFT  SHOULDER   NORMAL POSTURE  AROM 105  / 95 / 70 / 0 TENDER: GH JOINT  SPECIAL TESTING : NGUYEN - POSITIVE  COLLETTE - POSITIVE  SPEED TEST - POSITIVE  BOYLE - NEGATIVE  APPREHENSION AND SUPPRESSION - NEGATIVE   RC STRENGTH TESTING  SS:  5/5 SUB 5/5 IS     5/5 BICEPS  5/5  SENSATION  - GROSSLY INTACT

## 2024-03-12 ENCOUNTER — APPOINTMENT (OUTPATIENT)
Age: 79
End: 2024-03-12

## 2024-03-15 ENCOUNTER — APPOINTMENT (OUTPATIENT)
Dept: ORTHOPEDIC SURGERY | Facility: CLINIC | Age: 79
End: 2024-03-15

## 2024-03-25 ENCOUNTER — OFFICE VISIT (OUTPATIENT)
Dept: PRIMARY CARE | Facility: CLINIC | Age: 79
End: 2024-03-25
Payer: MEDICARE

## 2024-03-25 VITALS
SYSTOLIC BLOOD PRESSURE: 122 MMHG | TEMPERATURE: 97.8 F | BODY MASS INDEX: 28.89 KG/M2 | DIASTOLIC BLOOD PRESSURE: 74 MMHG | HEART RATE: 70 BPM | WEIGHT: 179 LBS

## 2024-03-25 DIAGNOSIS — F10.10 ALCOHOL ABUSE: ICD-10-CM

## 2024-03-25 DIAGNOSIS — J43.9 PULMONARY EMPHYSEMA, UNSPECIFIED EMPHYSEMA TYPE (MULTI): ICD-10-CM

## 2024-03-25 DIAGNOSIS — I63.81 LACUNAR INFARCT, ACUTE (MULTI): ICD-10-CM

## 2024-03-25 DIAGNOSIS — H34.232 RETINAL ARTERY BRANCH OCCLUSION OF LEFT EYE: ICD-10-CM

## 2024-03-25 DIAGNOSIS — H34.232 RETINAL ARTERY BRANCH OCCLUSION OF LEFT EYE: Primary | ICD-10-CM

## 2024-03-25 PROCEDURE — 1159F MED LIST DOCD IN RCRD: CPT | Performed by: INTERNAL MEDICINE

## 2024-03-25 PROCEDURE — 99213 OFFICE O/P EST LOW 20 MIN: CPT | Performed by: INTERNAL MEDICINE

## 2024-03-25 PROCEDURE — 1036F TOBACCO NON-USER: CPT | Performed by: INTERNAL MEDICINE

## 2024-03-25 NOTE — PROGRESS NOTES
Subjective   Patient ID: Oscar Lomeli is a 79 y.o. male who presents for Follow-up (3 MONTH CHECK UP ).    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

## 2024-03-26 RX ORDER — B-COMPLEX WITH VITAMIN C
1 CAPSULE ORAL DAILY
Qty: 90 EACH | Refills: 1 | Status: SHIPPED | OUTPATIENT
Start: 2024-03-26

## 2024-03-27 ENCOUNTER — TELEPHONE (OUTPATIENT)
Dept: PHARMACY | Facility: HOSPITAL | Age: 79
End: 2024-03-27
Payer: MEDICARE

## 2024-03-27 NOTE — TELEPHONE ENCOUNTER
Population Health: Outreach by Ambulatory Pharmacy Team    Payor: Srikanth BHARDWAJ  Reason: Adherence  Medication: Atorvastatin 40 mg  Outcome: Left Voicemail    Alexandro Myers, PharmD

## 2024-04-10 ENCOUNTER — PATIENT OUTREACH (OUTPATIENT)
Dept: PRIMARY CARE | Facility: CLINIC | Age: 79
End: 2024-04-10
Payer: MEDICARE

## 2024-04-10 NOTE — PROGRESS NOTES
Patient has met target of no readmission for (90) days post hospital discharge and is graduated from Transitional Care Management program at this time.  Oscar states he is doing well, no concerns.

## 2024-06-03 DIAGNOSIS — G62.9 NEUROPATHY: ICD-10-CM

## 2024-06-03 RX ORDER — GABAPENTIN 300 MG/1
300 CAPSULE ORAL 2 TIMES DAILY
Qty: 180 CAPSULE | Refills: 0 | Status: SHIPPED | OUTPATIENT
Start: 2024-06-03

## 2024-06-26 ENCOUNTER — APPOINTMENT (OUTPATIENT)
Dept: PRIMARY CARE | Facility: CLINIC | Age: 79
End: 2024-06-26
Payer: MEDICARE

## 2024-06-26 VITALS
RESPIRATION RATE: 18 BRPM | WEIGHT: 179 LBS | BODY MASS INDEX: 28.77 KG/M2 | DIASTOLIC BLOOD PRESSURE: 68 MMHG | HEART RATE: 76 BPM | TEMPERATURE: 98.1 F | HEIGHT: 66 IN | SYSTOLIC BLOOD PRESSURE: 130 MMHG | OXYGEN SATURATION: 94 %

## 2024-06-26 DIAGNOSIS — R73.03 PREDIABETES: ICD-10-CM

## 2024-06-26 DIAGNOSIS — Z00.00 MEDICARE ANNUAL WELLNESS VISIT, SUBSEQUENT: ICD-10-CM

## 2024-06-26 DIAGNOSIS — I63.81 LACUNAR INFARCT, ACUTE (MULTI): ICD-10-CM

## 2024-06-26 DIAGNOSIS — R56.9 SEIZURE (MULTI): ICD-10-CM

## 2024-06-26 DIAGNOSIS — Z98.890 STATUS POST CAROTID ENDARTERECTOMY: ICD-10-CM

## 2024-06-26 DIAGNOSIS — H34.232 RETINAL ARTERY BRANCH OCCLUSION OF LEFT EYE: ICD-10-CM

## 2024-06-26 DIAGNOSIS — I10 PRIMARY HYPERTENSION: ICD-10-CM

## 2024-06-26 DIAGNOSIS — Z00.00 WELLNESS EXAMINATION: ICD-10-CM

## 2024-06-26 DIAGNOSIS — E03.9 HYPOTHYROIDISM, UNSPECIFIED TYPE: ICD-10-CM

## 2024-06-26 DIAGNOSIS — F10.10 ALCOHOL ABUSE: ICD-10-CM

## 2024-06-26 DIAGNOSIS — Z12.5 PROSTATE CANCER SCREENING: ICD-10-CM

## 2024-06-26 DIAGNOSIS — J44.9 CHRONIC OBSTRUCTIVE PULMONARY DISEASE, UNSPECIFIED COPD TYPE (MULTI): ICD-10-CM

## 2024-06-26 DIAGNOSIS — F32.A DEPRESSION, UNSPECIFIED DEPRESSION TYPE: ICD-10-CM

## 2024-06-26 DIAGNOSIS — J43.9 PULMONARY EMPHYSEMA, UNSPECIFIED EMPHYSEMA TYPE (MULTI): Primary | ICD-10-CM

## 2024-06-26 PROCEDURE — 1170F FXNL STATUS ASSESSED: CPT | Performed by: INTERNAL MEDICINE

## 2024-06-26 PROCEDURE — 3078F DIAST BP <80 MM HG: CPT | Performed by: INTERNAL MEDICINE

## 2024-06-26 PROCEDURE — 3075F SYST BP GE 130 - 139MM HG: CPT | Performed by: INTERNAL MEDICINE

## 2024-06-26 PROCEDURE — 99397 PER PM REEVAL EST PAT 65+ YR: CPT | Performed by: INTERNAL MEDICINE

## 2024-06-26 PROCEDURE — 99213 OFFICE O/P EST LOW 20 MIN: CPT | Performed by: INTERNAL MEDICINE

## 2024-06-26 PROCEDURE — G0439 PPPS, SUBSEQ VISIT: HCPCS | Performed by: INTERNAL MEDICINE

## 2024-06-26 PROCEDURE — 1159F MED LIST DOCD IN RCRD: CPT | Performed by: INTERNAL MEDICINE

## 2024-06-26 RX ORDER — FLUOXETINE HYDROCHLORIDE 20 MG/1
20 CAPSULE ORAL DAILY
Qty: 30 CAPSULE | Refills: 5 | Status: SHIPPED | OUTPATIENT
Start: 2024-06-26 | End: 2024-12-23

## 2024-06-26 ASSESSMENT — PATIENT HEALTH QUESTIONNAIRE - PHQ9
2. FEELING DOWN, DEPRESSED OR HOPELESS: SEVERAL DAYS
10. IF YOU CHECKED OFF ANY PROBLEMS, HOW DIFFICULT HAVE THESE PROBLEMS MADE IT FOR YOU TO DO YOUR WORK, TAKE CARE OF THINGS AT HOME, OR GET ALONG WITH OTHER PEOPLE: NOT DIFFICULT AT ALL
1. LITTLE INTEREST OR PLEASURE IN DOING THINGS: NOT AT ALL
2. FEELING DOWN, DEPRESSED OR HOPELESS: NOT AT ALL
1. LITTLE INTEREST OR PLEASURE IN DOING THINGS: SEVERAL DAYS
SUM OF ALL RESPONSES TO PHQ9 QUESTIONS 1 AND 2: 0
SUM OF ALL RESPONSES TO PHQ9 QUESTIONS 1 AND 2: 2

## 2024-06-26 ASSESSMENT — ACTIVITIES OF DAILY LIVING (ADL)
DRESSING: INDEPENDENT
TAKING_MEDICATION: INDEPENDENT
MANAGING_FINANCES: INDEPENDENT
DOING_HOUSEWORK: INDEPENDENT
BATHING: INDEPENDENT
GROCERY_SHOPPING: INDEPENDENT

## 2024-06-26 ASSESSMENT — ENCOUNTER SYMPTOMS
LOSS OF SENSATION IN FEET: 0
DEPRESSION: 1
OCCASIONAL FEELINGS OF UNSTEADINESS: 1

## 2024-06-26 NOTE — PROGRESS NOTES
"Subjective   Reason for Visit: Oscar Lomeli is an 79 y.o. male here for a Medicare Wellness visit.     Past Medical, Surgical, and Family History reviewed and updated in chart.    Reviewed all medications by prescribing practitioner or clinical pharmacist (such as prescriptions, OTCs, herbal therapies and supplements) and documented in the medical record.    HPI  WIFE IS NOT DOING WELL   HE WANTS MEDS FOR DEPRESSION    MMSE 30 30     PHQ 2 POS.     NO FALLS NOTED     HE SAYS NO ETOH     TRYING TO LOSE WEIGHT    Past Surgical History:   Procedure Laterality Date   ASD REPAIR   CAROTID ARTERY SURGERY (HISTORICAL) Left 01/05/2024   LEFT TRANSCAROTID ARTERY RE-VASCULARIZATION - Left   CARPAL TUNNEL RELEASE   CHOLECYSTECTOMY   CVHX IMPLANT PPM 10/2021   ACH by Dr. Padilla d/t Sx bradycardia   HERNIA REPAIR   TRANSESOPHAGEAL ECHOCARDIOGRAM       PMHX  Stable chronic problems affecting care, new non-acute diagnoses:  CKD  COPD not in AE  Alcohol use disorder  Pacemaker   DM2  Epilepsy  Hypertension  Mobitz type II second-degree heart block  History of stroke  Valvular disease       SOCHX    \CURRENTLY NON DRINKER  NO SMOKER   RETIRED     PRE MED UTD  Patient Care Team:  Efrain Stark MD as PCP - General  Efrain Stark MD as PCP - [employer] PCP     Review of Systems  ABOVE   Objective   Vitals:  /68 (BP Location: Left arm, Patient Position: Sitting, BP Cuff Size: Adult)   Pulse 76   Temp 36.7 °C (98.1 °F) (Temporal)   Resp 18   Ht 1.676 m (5' 6\")   Wt 81.2 kg (179 lb)   SpO2 94%   BMI 28.89 kg/m²       Physical Exam  Constitutional:       Appearance: Normal appearance.   HENT:      Head: Normocephalic and atraumatic.      Right Ear: Tympanic membrane normal.      Left Ear: Tympanic membrane normal.      Nose: Nose normal.   Eyes:      Extraocular Movements: Extraocular movements intact.      Conjunctiva/sclera: Conjunctivae normal.      Pupils: Pupils are equal, round, and reactive " to light.   Cardiovascular:      Rate and Rhythm: Normal rate and regular rhythm.      Pulses: Normal pulses.      Heart sounds: Normal heart sounds.   Pulmonary:      Effort: Pulmonary effort is normal.      Breath sounds: Normal breath sounds.   Abdominal:      General: Abdomen is flat. Bowel sounds are normal.      Palpations: Abdomen is soft.   Musculoskeletal:         General: Normal range of motion.      Cervical back: Normal range of motion and neck supple.   Skin:     General: Skin is warm and dry.   Neurological:      General: No focal deficit present.      Mental Status: He is oriented to person, place, and time. Mental status is at baseline.   Psychiatric:         Mood and Affect: Mood normal.         Behavior: Behavior normal.         Thought Content: Thought content normal.         Judgment: Judgment normal.         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    Overview     SEE GREGORY   STABLE NO ISSUES         Relevant Medications    FLUoxetine (PROzac) 20 mg capsule    Lacunar infarct, acute (Multi)    Overview     SEE SCAN         Relevant Medications    FLUoxetine (PROzac) 20 mg capsule    Alcohol abuse    Overview     HE SAYS HE IS SOBER         Relevant Medications    FLUoxetine (PROzac) 20 mg capsule    Chronic obstructive pulmonary disease (Multi) - Primary    Overview     REMOTE SMOKER         Relevant Medications    FLUoxetine (PROzac) 20 mg capsule    Other Relevant Orders    Referral to Clinical Pharmacy    Seizure (Multi)    Relevant Medications    FLUoxetine (PROzac) 20 mg capsule    Primary hypertension    Relevant Medications    FLUoxetine (PROzac) 20 mg capsule    Other Relevant Orders    CBC    Status post carotid endarterectomy    Relevant Medications    FLUoxetine (PROzac) 20 mg capsule    Wellness examination    Relevant Medications    FLUoxetine (PROzac) 20 mg capsule    Other Relevant Orders    CBC    Lipid Panel     Comprehensive Metabolic Panel    Depression    Overview     SEE PHQ 2  DESIRES MEDS  WILL BEGIN MEDS AND CLOSE FOLLOW UP         Prediabetes    Relevant Medications    FLUoxetine (PROzac) 20 mg capsule    Other Relevant Orders    Hemoglobin A1C     Other Visit Diagnoses       Hypothyroidism, unspecified type        Relevant Orders    TSH with reflex to Free T4 if abnormal

## 2024-07-26 ENCOUNTER — APPOINTMENT (OUTPATIENT)
Dept: PRIMARY CARE | Facility: CLINIC | Age: 79
End: 2024-07-26
Payer: MEDICARE

## 2024-07-26 VITALS
HEART RATE: 68 BPM | WEIGHT: 180 LBS | SYSTOLIC BLOOD PRESSURE: 128 MMHG | OXYGEN SATURATION: 94 % | BODY MASS INDEX: 28.93 KG/M2 | DIASTOLIC BLOOD PRESSURE: 70 MMHG | HEIGHT: 66 IN

## 2024-07-26 DIAGNOSIS — J43.9 PULMONARY EMPHYSEMA, UNSPECIFIED EMPHYSEMA TYPE (MULTI): ICD-10-CM

## 2024-07-26 DIAGNOSIS — F10.10 ALCOHOL ABUSE: ICD-10-CM

## 2024-07-26 DIAGNOSIS — Z98.890 STATUS POST CAROTID ENDARTERECTOMY: ICD-10-CM

## 2024-07-26 DIAGNOSIS — I63.81 LACUNAR INFARCT, ACUTE (MULTI): Primary | ICD-10-CM

## 2024-07-26 DIAGNOSIS — R56.9 SEIZURE (MULTI): ICD-10-CM

## 2024-07-26 DIAGNOSIS — I10 PRIMARY HYPERTENSION: ICD-10-CM

## 2024-07-26 PROCEDURE — 1159F MED LIST DOCD IN RCRD: CPT | Performed by: INTERNAL MEDICINE

## 2024-07-26 PROCEDURE — 3078F DIAST BP <80 MM HG: CPT | Performed by: INTERNAL MEDICINE

## 2024-07-26 PROCEDURE — 99213 OFFICE O/P EST LOW 20 MIN: CPT | Performed by: INTERNAL MEDICINE

## 2024-07-26 PROCEDURE — 3074F SYST BP LT 130 MM HG: CPT | Performed by: INTERNAL MEDICINE

## 2024-07-26 NOTE — PROGRESS NOTES
"Subjective   Patient ID: Oscar Lomeli is a 79 y.o. male who presents for Follow-up.    HPI FEELS WELL  ]DOING GOOD OVERALL   \MOOD IS GOOD      Review of Systems    Objective   /70 (BP Location: Left arm, Patient Position: Sitting, BP Cuff Size: Adult)   Pulse 68   Ht 1.676 m (5' 6\")   Wt 81.6 kg (180 lb)   SpO2 94%   BMI 29.05 kg/m²     Physical Exam  NAD  CARD RRR  PULM CTA  ABD NEG   EXT  NL    Assessment/Plan   Diagnoses and all orders for this visit:  Lacunar infarct, acute (Multi)  Comments:  STABLE  Alcohol abuse  Comments:  HE IS DRINIKING AGAIN  Pulmonary emphysema, unspecified emphysema type (Multi)  Comments:  OK  Seizure (Multi)  Comments:  OK  Primary hypertension  Comments:  GOOD  Status post carotid endarterectomy  Comments:  OK  Other orders  -     Follow Up In Primary Care - Established         "

## 2024-08-23 ENCOUNTER — APPOINTMENT (OUTPATIENT)
Dept: PHARMACY | Facility: HOSPITAL | Age: 79
End: 2024-08-23
Payer: MEDICARE

## 2024-09-03 DIAGNOSIS — G62.9 NEUROPATHY: ICD-10-CM

## 2024-09-03 RX ORDER — GABAPENTIN 300 MG/1
300 CAPSULE ORAL 2 TIMES DAILY
Qty: 180 CAPSULE | Refills: 0 | Status: SHIPPED | OUTPATIENT
Start: 2024-09-03

## 2024-09-27 ENCOUNTER — TELEMEDICINE (OUTPATIENT)
Dept: PHARMACY | Facility: HOSPITAL | Age: 79
End: 2024-09-27
Payer: MEDICARE

## 2024-09-27 DIAGNOSIS — J43.9 PULMONARY EMPHYSEMA, UNSPECIFIED EMPHYSEMA TYPE (MULTI): ICD-10-CM

## 2024-09-27 NOTE — PROGRESS NOTES
Subjective   Patient ID: Oscar Lomeli is a 79 y.o. male who presents for Coronary Artery Disease, COPD, and Asthma.    Referring Provider: Efrain Stark MD     [facility]  Current regimen:  Advair 230-21 HFA 2 puffs BID    Appropriate Inhaler Technique: yes    ASTHMA CONTROL:  -Primary symptoms: shortness of breath with exertion  -Symptoms/week: < or = 2 days/week  -Nighttime awakenings: < or = 2 times/month   -PATRICIA use: < or = 2 days/week  -Interference with normal activity: minor limitation  -Asthma Exacerbations (requiring oral steroids): 0-1/year      Review of Systems    Medication System Management:  Affordability/Accessibility: Currently meds are well-covered  Adherence/Organization: opportunity to improve Advair adherence, some challenges with ability as a historian such as medication names  Adverse Effects: n/a    Objective     There were no vitals taken for this visit.     Labs  Lab Results   Component Value Date    BILITOT 0.6 02/09/2023    CALCIUM 9.8 06/26/2023    CO2 27 06/26/2023     06/26/2023    CREATININE 1.20 06/26/2023    GLUCOSE 133 (H) 06/26/2023    ALKPHOS 78 02/09/2023    K 4.4 06/26/2023    PROT 6.9 02/09/2023     06/26/2023    AST 17 02/09/2023    ALT 10 02/09/2023    BUN 11 06/26/2023    ANIONGAP 13 06/26/2023    ALBUMIN 4.4 02/09/2023    GFRMALE 63 06/26/2023     Lab Results   Component Value Date    TRIG 55 02/09/2023    CHOL 177 02/09/2023    HDL 63.8 02/09/2023     Lab Results   Component Value Date    HGBA1C 5.5 12/30/2023       Current Outpatient Medications on File Prior to Visit   Medication Sig Dispense Refill    aspirin 81 mg EC tablet Take 1 tablet (81 mg) by mouth once daily.      atorvastatin (Lipitor) 80 mg tablet Take 0.5 tablets (40 mg) by mouth once daily in the evening.      clopidogrel (Plavix) 75 mg tablet Take 1 tablet (75 mg) by mouth once daily.      fluticasone propion-salmeteroL (Advair HFA) 230-21 mcg/actuation inhaler Inhale 2 puffs every 12 hours.       folic acid (Folvite) 1 mg tablet Take 1 tablet (1 mg) by mouth once daily.      gabapentin (Neurontin) 300 mg capsule TAKE 1 CAPSULE BY MOUTH TWICE A  capsule 0    thiamine 100 mg tablet Take 1 tablet (100 mg) by mouth once daily.      B-complex with vitamin C capsule TAKE 1 CAPSULE BY MOUTH EVERY DAY 90 each 1    dextromethorphan-guaifenesin (Robitussin DM)  mg/5 mL oral liquid Take 5 mL by mouth 4 times a day as needed for cough.      FLUoxetine (PROzac) 20 mg capsule Take 1 capsule (20 mg) by mouth once daily. 30 capsule 5    lamoTRIgine (LaMICtal) 25 mg chewable tablet Take 2 tablets (50 mg) by mouth 2 times a day.      levothyroxine (Synthroid, Levoxyl) 25 mcg tablet Take 1 tablet (25 mcg) by mouth once daily.      meloxicam (Mobic) 15 mg tablet Take 1 tablet (15 mg) by mouth once every 24 hours.      omeprazole (PriLOSEC) 20 mg DR capsule TAKE 1 CAPSULE BY MOUTH EVERY DAY 90 capsule 0    sildenafil (Viagra) 100 mg tablet Take 1 tablet (100 mg) by mouth once daily as needed.      suvorexant (Belsomra) 20 mg tablet Take 1 tablet (20 mg) by mouth once every 24 hours.       No current facility-administered medications on file prior to visit.        Assessment/Plan   Problem List Items Addressed This Visit       Chronic obstructive pulmonary disease (Multi)   Use Advair 2 puffs twice daily, rinse mouth after use  Completed full medication review; patient voiced that he has stopped Plavix at last vascular surgery visit  Follow up 10/25/24 @ 1100 to assess asthma benefit    Ernesto Villafuerte, PharmD    Continue all meds under the continuation of care with the referring provider and clinical pharmacy team.

## 2024-10-25 ENCOUNTER — APPOINTMENT (OUTPATIENT)
Dept: PHARMACY | Facility: HOSPITAL | Age: 79
End: 2024-10-25
Payer: MEDICARE

## 2024-10-25 DIAGNOSIS — J43.9 PULMONARY EMPHYSEMA, UNSPECIFIED EMPHYSEMA TYPE (MULTI): ICD-10-CM

## 2024-10-25 NOTE — PROGRESS NOTES
Subjective   Patient ID: Oscar Lomeli is a 79 y.o. male who presents for COPD.    Referring Provider: Efrain Stark MD     HPI  Current regimen:  Advair 230-21 HFA 2 puffs BID    Reports significant improvement and no issue with shortness of breath.     Appropriate Inhaler Technique: yes    COPD CONTROL:  -Primary symptoms: shortness of breath with exertion  -Symptoms/week: < or = 2 days/week  -Nighttime awakenings: < or = 2 times/month   -PATRICIA use: < or = 2 days/week  -Interference with normal activity: minor limitation      Review of Systems    Medication System Management:  Affordability/Accessibility: Currently meds are well-covered  Adherence/Organization: opportunity to improve Advair adherence, some challenges with ability as a historian such as medication names  Adverse Effects: n/a    Objective     There were no vitals taken for this visit.     Labs  Lab Results   Component Value Date    BILITOT 0.6 02/09/2023    CALCIUM 9.8 06/26/2023    CO2 27 06/26/2023     06/26/2023    CREATININE 1.20 06/26/2023    GLUCOSE 133 (H) 06/26/2023    ALKPHOS 78 02/09/2023    K 4.4 06/26/2023    PROT 6.9 02/09/2023     06/26/2023    AST 17 02/09/2023    ALT 10 02/09/2023    BUN 11 06/26/2023    ANIONGAP 13 06/26/2023    ALBUMIN 4.4 02/09/2023    GFRMALE 63 06/26/2023     Lab Results   Component Value Date    TRIG 55 02/09/2023    CHOL 177 02/09/2023    HDL 63.8 02/09/2023     Lab Results   Component Value Date    HGBA1C 5.5 12/30/2023       Current Outpatient Medications on File Prior to Visit   Medication Sig Dispense Refill    aspirin 81 mg EC tablet Take 1 tablet (81 mg) by mouth once daily.      atorvastatin (Lipitor) 80 mg tablet Take 0.5 tablets (40 mg) by mouth once daily in the evening.      B-complex with vitamin C capsule TAKE 1 CAPSULE BY MOUTH EVERY DAY 90 each 1    clopidogrel (Plavix) 75 mg tablet Take 1 tablet (75 mg) by mouth once daily.      dextromethorphan-guaifenesin (Robitussin DM)   mg/5 mL oral liquid Take 5 mL by mouth 4 times a day as needed for cough.      FLUoxetine (PROzac) 20 mg capsule Take 1 capsule (20 mg) by mouth once daily. (Patient not taking: Reported on 9/27/2024) 30 capsule 5    fluticasone propion-salmeteroL (Advair HFA) 230-21 mcg/actuation inhaler Inhale 2 puffs every 12 hours.      folic acid (Folvite) 1 mg tablet Take 1 tablet (1 mg) by mouth once daily.      gabapentin (Neurontin) 300 mg capsule TAKE 1 CAPSULE BY MOUTH TWICE A  capsule 0    lamoTRIgine (LaMICtal) 25 mg chewable tablet Take 2 tablets (50 mg) by mouth 2 times a day.      levothyroxine (Synthroid, Levoxyl) 25 mcg tablet Take 1 tablet (25 mcg) by mouth once daily.      meloxicam (Mobic) 15 mg tablet Take 1 tablet (15 mg) by mouth once every 24 hours.      omeprazole (PriLOSEC) 20 mg DR capsule TAKE 1 CAPSULE BY MOUTH EVERY DAY (Patient not taking: Reported on 9/27/2024) 90 capsule 0    sildenafil (Viagra) 100 mg tablet Take 1 tablet (100 mg) by mouth once daily as needed.      suvorexant (Belsomra) 20 mg tablet Take 1 tablet (20 mg) by mouth once every 24 hours.      thiamine 100 mg tablet Take 1 tablet (100 mg) by mouth once daily.       No current facility-administered medications on file prior to visit.        Assessment/Plan   Problem List Items Addressed This Visit       Chronic obstructive pulmonary disease (Multi)   Use Advair 2 puffs twice daily, rinse mouth after use, if patient reports uncontrolled symptoms, consider addition of LAMA for COPD benefit  Follow up JOSUE Villafuerte, PharmD    Continue all meds under the continuation of care with the referring provider and clinical pharmacy team.

## 2024-12-03 DIAGNOSIS — G62.9 NEUROPATHY: ICD-10-CM

## 2024-12-03 RX ORDER — GABAPENTIN 300 MG/1
300 CAPSULE ORAL 2 TIMES DAILY
Qty: 180 CAPSULE | Refills: 0 | Status: SHIPPED | OUTPATIENT
Start: 2024-12-03

## 2025-01-31 ENCOUNTER — APPOINTMENT (OUTPATIENT)
Dept: PRIMARY CARE | Facility: CLINIC | Age: 80
End: 2025-01-31
Payer: MEDICARE

## 2025-01-31 VITALS
HEIGHT: 66 IN | SYSTOLIC BLOOD PRESSURE: 130 MMHG | DIASTOLIC BLOOD PRESSURE: 74 MMHG | HEART RATE: 68 BPM | WEIGHT: 188 LBS | TEMPERATURE: 97.3 F | RESPIRATION RATE: 18 BRPM | BODY MASS INDEX: 30.22 KG/M2 | OXYGEN SATURATION: 94 %

## 2025-01-31 DIAGNOSIS — Z12.5 PROSTATE CANCER SCREENING: ICD-10-CM

## 2025-01-31 DIAGNOSIS — F32.A DEPRESSION, UNSPECIFIED DEPRESSION TYPE: ICD-10-CM

## 2025-01-31 DIAGNOSIS — R79.9 ABNORMAL FINDING OF BLOOD CHEMISTRY, UNSPECIFIED: ICD-10-CM

## 2025-01-31 DIAGNOSIS — Z00.00 WELLNESS EXAMINATION: ICD-10-CM

## 2025-01-31 DIAGNOSIS — R56.9 SEIZURE (MULTI): ICD-10-CM

## 2025-01-31 DIAGNOSIS — Z98.890 STATUS POST CAROTID ENDARTERECTOMY: ICD-10-CM

## 2025-01-31 DIAGNOSIS — F10.10 ALCOHOL ABUSE: ICD-10-CM

## 2025-01-31 DIAGNOSIS — H61.23 CERUMEN DEBRIS ON TYMPANIC MEMBRANE OF BOTH EARS: ICD-10-CM

## 2025-01-31 DIAGNOSIS — E11.59 TYPE 2 DIABETES MELLITUS WITH OTHER CIRCULATORY COMPLICATIONS: ICD-10-CM

## 2025-01-31 DIAGNOSIS — R73.03 PREDIABETES: ICD-10-CM

## 2025-01-31 DIAGNOSIS — I63.81 LACUNAR INFARCT, ACUTE (MULTI): ICD-10-CM

## 2025-01-31 DIAGNOSIS — G81.91 HEMIPLEGIA AFFECTING RIGHT DOMINANT SIDE, UNSPECIFIED ETIOLOGY, UNSPECIFIED HEMIPLEGIA TYPE (MULTI): ICD-10-CM

## 2025-01-31 DIAGNOSIS — I10 PRIMARY HYPERTENSION: ICD-10-CM

## 2025-01-31 DIAGNOSIS — F10.20 ALCOHOL DEPENDENCE, UNCOMPLICATED (MULTI): ICD-10-CM

## 2025-01-31 DIAGNOSIS — I48.91 ATRIAL FIBRILLATION, UNSPECIFIED TYPE (MULTI): ICD-10-CM

## 2025-01-31 DIAGNOSIS — Z00.00 MEDICARE ANNUAL WELLNESS VISIT, SUBSEQUENT: Primary | ICD-10-CM

## 2025-01-31 DIAGNOSIS — J43.9 PULMONARY EMPHYSEMA, UNSPECIFIED EMPHYSEMA TYPE (MULTI): ICD-10-CM

## 2025-01-31 DIAGNOSIS — H34.232 RETINAL ARTERY BRANCH OCCLUSION OF LEFT EYE: ICD-10-CM

## 2025-01-31 LAB — POC HEMOGLOBIN A1C: 5.9 % (ref 4.2–6.5)

## 2025-01-31 PROCEDURE — 99397 PER PM REEVAL EST PAT 65+ YR: CPT | Performed by: INTERNAL MEDICINE

## 2025-01-31 PROCEDURE — 3078F DIAST BP <80 MM HG: CPT | Performed by: INTERNAL MEDICINE

## 2025-01-31 PROCEDURE — 90732 PPSV23 VACC 2 YRS+ SUBQ/IM: CPT | Performed by: INTERNAL MEDICINE

## 2025-01-31 PROCEDURE — 83036 HEMOGLOBIN GLYCOSYLATED A1C: CPT | Performed by: INTERNAL MEDICINE

## 2025-01-31 PROCEDURE — 1159F MED LIST DOCD IN RCRD: CPT | Performed by: INTERNAL MEDICINE

## 2025-01-31 PROCEDURE — G0008 ADMIN INFLUENZA VIRUS VAC: HCPCS | Performed by: INTERNAL MEDICINE

## 2025-01-31 PROCEDURE — 90656 IIV3 VACC NO PRSV 0.5 ML IM: CPT | Performed by: INTERNAL MEDICINE

## 2025-01-31 PROCEDURE — 3075F SYST BP GE 130 - 139MM HG: CPT | Performed by: INTERNAL MEDICINE

## 2025-01-31 PROCEDURE — 1170F FXNL STATUS ASSESSED: CPT | Performed by: INTERNAL MEDICINE

## 2025-01-31 PROCEDURE — G0009 ADMIN PNEUMOCOCCAL VACCINE: HCPCS | Performed by: INTERNAL MEDICINE

## 2025-01-31 PROCEDURE — G0439 PPPS, SUBSEQ VISIT: HCPCS | Performed by: INTERNAL MEDICINE

## 2025-01-31 PROCEDURE — 99213 OFFICE O/P EST LOW 20 MIN: CPT | Performed by: INTERNAL MEDICINE

## 2025-01-31 ASSESSMENT — ENCOUNTER SYMPTOMS
EYE PAIN: 0
DIAPHORESIS: 0
EYE ITCHING: 0
UNEXPECTED WEIGHT CHANGE: 0
COUGH: 0
FEVER: 0
NUMBNESS: 0
PSYCHIATRIC NEGATIVE: 1
APPETITE CHANGE: 0
STRIDOR: 0
PHOTOPHOBIA: 0
LIGHT-HEADEDNESS: 0
HEMATOLOGIC/LYMPHATIC NEGATIVE: 1
CHEST TIGHTNESS: 0
CHOKING: 0
GASTROINTESTINAL NEGATIVE: 1
DIZZINESS: 0
EYE REDNESS: 0
ACTIVITY CHANGE: 0
ENDOCRINE NEGATIVE: 1
FATIGUE: 0
PALPITATIONS: 0
HEADACHES: 0
ARTHRALGIAS: 0
FACIAL ASYMMETRY: 0
ALLERGIC/IMMUNOLOGIC NEGATIVE: 1
CHILLS: 0
APNEA: 0
SHORTNESS OF BREATH: 0
EYE DISCHARGE: 0
WHEEZING: 0

## 2025-01-31 ASSESSMENT — ACTIVITIES OF DAILY LIVING (ADL)
DOING_HOUSEWORK: INDEPENDENT
TAKING_MEDICATION: INDEPENDENT
GROCERY_SHOPPING: INDEPENDENT
BATHING: INDEPENDENT
DRESSING: INDEPENDENT
MANAGING_FINANCES: INDEPENDENT

## 2025-01-31 ASSESSMENT — PATIENT HEALTH QUESTIONNAIRE - PHQ9
SUM OF ALL RESPONSES TO PHQ9 QUESTIONS 1 AND 2: 0
1. LITTLE INTEREST OR PLEASURE IN DOING THINGS: NOT AT ALL
2. FEELING DOWN, DEPRESSED OR HOPELESS: NOT AT ALL

## 2025-01-31 NOTE — ASSESSMENT & PLAN NOTE
Orders:    POCT glycosylated hemoglobin (Hb A1C) manually resulted    CBC; Future    Lipid Panel; Future    Comprehensive Metabolic Panel; Future    TSH with reflex to Free T4 if abnormal; Future    Urinalysis with Reflex Culture and Microscopic; Future

## 2025-01-31 NOTE — ASSESSMENT & PLAN NOTE
No issues  Orders:    POCT glycosylated hemoglobin (Hb A1C) manually resulted    TSH with reflex to Free T4 if abnormal; Future    Urinalysis with Reflex Culture and Microscopic; Future

## 2025-01-31 NOTE — ASSESSMENT & PLAN NOTE
Clean out warm water   Orders:    POCT glycosylated hemoglobin (Hb A1C) manually resulted    TSH with reflex to Free T4 if abnormal; Future    Urinalysis with Reflex Culture and Microscopic; Future

## 2025-01-31 NOTE — ASSESSMENT & PLAN NOTE
labs  Orders:    POCT glycosylated hemoglobin (Hb A1C) manually resulted    TSH with reflex to Free T4 if abnormal; Future    Urinalysis with Reflex Culture and Microscopic; Future

## 2025-01-31 NOTE — ASSESSMENT & PLAN NOTE
Offered support  Orders:    POCT glycosylated hemoglobin (Hb A1C) manually resulted    TSH with reflex to Free T4 if abnormal; Future    Urinalysis with Reflex Culture and Microscopic; Future

## 2025-01-31 NOTE — ASSESSMENT & PLAN NOTE
pneumovax  Orders:    POCT glycosylated hemoglobin (Hb A1C) manually resulted    TSH with reflex to Free T4 if abnormal; Future    Urinalysis with Reflex Culture and Microscopic; Future

## 2025-01-31 NOTE — ASSESSMENT & PLAN NOTE
good  Orders:    POCT glycosylated hemoglobin (Hb A1C) manually resulted    TSH with reflex to Free T4 if abnormal; Future    Urinalysis with Reflex Culture and Microscopic; Future

## 2025-01-31 NOTE — PROGRESS NOTES
Subjective   Reason for Visit: Oscar Lomeli is an 80 y.o. male here for a Medicare Wellness visit.     Past Medical, Surgical, and Family History reviewed and updated in chart.    Reviewed all medications by prescribing practitioner or clinical pharmacist (such as prescriptions, OTCs, herbal therapies and supplements) and documented in the medical record.    HPI PMHX, PSHX, ALL, SOCHX, PRE MED ALL REVIEWED     MMSE 30 30     PHQ 2 NL     NO FALLS NOTED     PREVENTIVE MEDICINE:  Mammogram  Dexa  Psa  Colonoscopy   VACCINES REVIEWED      The patient is doing well , feels well, no specific complaints.   Tolerating and taking med's with no significant side effects.   No other issues noted on questions.   Past Medical History:   Diagnosis Date   Abnormal CT scan of head   Arrhythmia   cardiac arrhythmia   Bradycardia 2021   implanted PPM   COPD (chronic obstructive pulmonary disease) (HCC)   Diabetes mellitus (HCC)   Disease of thyroid gland   Epilepsy (HCC)   Foramen ovale   Hypertension   Memory change   Mobitz type 2 second degree heart block   Stroke (HCC)   Valvular disease     Past Surgical History:   Past Surgical History:   Procedure Laterality Date   ASD REPAIR   CAROTID ARTERY SURGERY (HISTORICAL) Left 01/05/2024   LEFT TRANSCAROTID ARTERY RE-VASCULARIZATION - Left   CARPAL TUNNEL RELEASE   CHOLECYSTECTOMY   CVHX IMPLANT PPM 10/2021   ACH by Dr. Padilla d/t Sx bradycardia   HERNIA REPAIR   TRANSESOPHAGEAL ECHOCARDIOGRAM     Current Medications:     Current Outpatient Medications:   albuterol (2.5 MG/3ML) 0.083% nebulizer solution, Take 2.5 mg by nebulization every 6 hours as needed., Disp: , Rfl:   aspirin 325 MG tablet, Take 325 mg by mouth in the morning., Disp: , Rfl:   atorvastatin (Lipitor) 40 MG tablet, Take 1 tablet (40 mg) by mouth Nightly., Disp: 30 tablet, Rfl: 0  Blood Glucose Monitoring Suppl (ONE TOUCH ULTRA 2) w/Device kit, TESTS TWO TIMES A DAY DX E11.65, Disp: , Rfl:   fluticasone-salmeterol  (Advair HFA) 230-21 MCG/ACT inhaler, every 12 hours., Disp: , Rfl:   gabapentin (Neurontin) 300 MG capsule, Take 300 mg by mouth 2 times daily., Disp: , Rfl:   lamoTRIgine (LaMICtal) 25 MG chewable tablet, Chew 50 mg 2 times daily., Disp: , Rfl:   levothyroxine (Synthroid, Levoxyl) 25 MCG tablet, Every 24 hours., Disp: , Rfl:   losartan (Cozaar) 25 MG tablet, TAKE 1 TABLET BY MOUTH EVERY DAY, Disp: 90 tablet, Rfl: 3  meloxicam (Mobic) 15 MG tablet, Every 24 hours., Disp: , Rfl:   montelukast (Singulair) 10 MG tablet, Every 24 hours., Disp: , Rfl:   omeprazole (PriLOSEC) 20 MG DR capsule, , Disp: , Rfl:   OneTouch Ultra test strip, TEST TWO TIME A DAY E11.65, Disp: , Rfl:   sildenafil (Viagra) 100 MG tablet, Take 100 mg by mouth Daily as needed., Disp: , Rfl:     Allergies:   Patient has no known allergies.    Social History:   Social History     Socioeconomic History   Marital status: Single   Spouse name: Not on file   Number of children: Not on file   Years of education: Not on file   Highest education level: Not on file   Occupational History   Not on file   Tobacco Use   Smoking status: Never   Smokeless tobacco: Never   Substance and Sexual Activity   Alcohol use: Yes   Alcohol/week: 21.0 standard drinks of alcohol   Types: 21 Cans of beer per week   Comment: 3-6 beers per day   Drug use: No   Sexual activity: Not on file   Comment: Caffeine: 2 cups a day   Other Topics Concern   Not on file   Social History Narrative   Not on file     Social Determinants of Health     Financial Resource Strain: Not on file   Food Insecurity: Not on file   Transportation Needs: Not on file   Physical Activity: Not on file   Stress: Not on file   Social Connections: Not on file   Intimate Partner Violence: Not At Risk (12/29/2023)   Humiliation, Afraid, Rape, and Kick questionnaire   Fear of Current or Ex-Partner: No   Emotionally Abused: No   Physically Abused: No   Sexually Abused: No   Housing Stability: Unknown (12/29/2023)  "  Housing Stability Vital Sign   Unable to Pay for Housing in the Last Year: No   Number of Places Lived in the Last Year: Not on file   Unstable Housing in the Last Year: No     Family History:   Family History   Problem Relation Name Age of Onset   Arrhythmia Brother   Other ([id number]) Brother   ablation   Pacemaker Brother Obed   Patient Care Team:  Efrain Stark MD as PCP - General  Efrain Stark MD as PCP - [employer] PCP     Review of Systems   Constitutional:  Negative for activity change, appetite change, chills, diaphoresis, fatigue, fever and unexpected weight change.        Alcohol abuse   HENT: Negative.     Eyes:  Negative for photophobia, pain, discharge, redness, itching and visual disturbance.   Respiratory:  Negative for apnea, cough, choking, chest tightness, shortness of breath, wheezing and stridor.    Cardiovascular:  Negative for chest pain, palpitations and leg swelling.   Gastrointestinal: Negative.    Endocrine: Negative.    Genitourinary: Negative.    Musculoskeletal:  Negative for arthralgias.   Skin: Negative.    Allergic/Immunologic: Negative.    Neurological:  Negative for dizziness, facial asymmetry, light-headedness, numbness and headaches.   Hematological: Negative.    Psychiatric/Behavioral: Negative.         Objective   Vitals:  /74 (BP Location: Left arm, Patient Position: Sitting, BP Cuff Size: Adult)   Pulse 68   Temp 36.3 °C (97.3 °F) (Temporal)   Resp 18   Ht 1.676 m (5' 6\")   Wt 85.3 kg (188 lb)   SpO2 94%   BMI 30.34 kg/m²       Physical Exam  Constitutional:       Appearance: Normal appearance.   HENT:      Head: Normocephalic and atraumatic.      Right Ear: Tympanic membrane normal.      Left Ear: Tympanic membrane normal.      Nose: Nose normal.   Eyes:      Extraocular Movements: Extraocular movements intact.      Conjunctiva/sclera: Conjunctivae normal.      Pupils: Pupils are equal, round, and reactive to light.   Cardiovascular:      Rate " and Rhythm: Normal rate and regular rhythm.      Pulses: Normal pulses.      Heart sounds: Normal heart sounds.   Pulmonary:      Effort: Pulmonary effort is normal.      Breath sounds: Normal breath sounds.   Abdominal:      General: Abdomen is flat. Bowel sounds are normal.      Palpations: Abdomen is soft.   Musculoskeletal:         General: Normal range of motion.      Cervical back: Normal range of motion and neck supple.   Skin:     General: Skin is warm and dry.   Neurological:      General: No focal deficit present.      Mental Status: He is oriented to person, place, and time. Mental status is at baseline.   Psychiatric:         Mood and Affect: Mood normal.         Behavior: Behavior normal.         Thought Content: Thought content normal.         Judgment: Judgment normal.       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  ok  Orders:  •  POCT glycosylated hemoglobin (Hb A1C) manually resulted  •  TSH with reflex to Free T4 if abnormal; Future  •  Urinalysis with Reflex Culture and Microscopic; Future    Lacunar infarct, acute (Multi)  ok  Orders:  •  POCT glycosylated hemoglobin (Hb A1C) manually resulted  •  TSH with reflex to Free T4 if abnormal; Future  •  Urinalysis with Reflex Culture and Microscopic; Future    Alcohol abuse  Offered support  Orders:  •  POCT glycosylated hemoglobin (Hb A1C) manually resulted  •  TSH with reflex to Free T4 if abnormal; Future  •  Urinalysis with Reflex Culture and Microscopic; Future    Primary hypertension  ok  Orders:  •  POCT glycosylated hemoglobin (Hb A1C) manually resulted  •  TSH with reflex to Free T4 if abnormal; Future  •  Urinalysis with Reflex Culture and Microscopic; Future    Seizure (Multi)  ok  Orders:  •   POCT glycosylated hemoglobin (Hb A1C) manually resulted  •  TSH with reflex to Free T4 if abnormal; Future  •  Urinalysis with Reflex Culture and Microscopic; Future    Pulmonary emphysema, unspecified emphysema type (Multi)  pneumovax  Orders:  •  POCT glycosylated hemoglobin (Hb A1C) manually resulted  •  TSH with reflex to Free T4 if abnormal; Future  •  Urinalysis with Reflex Culture and Microscopic; Future    Status post carotid endarterectomy  No issues  Orders:  •  POCT glycosylated hemoglobin (Hb A1C) manually resulted  •  TSH with reflex to Free T4 if abnormal; Future  •  Urinalysis with Reflex Culture and Microscopic; Future    Wellness examination    Orders:  •  POCT glycosylated hemoglobin (Hb A1C) manually resulted  •  CBC; Future  •  Lipid Panel; Future  •  Comprehensive Metabolic Panel; Future  •  TSH with reflex to Free T4 if abnormal; Future  •  Urinalysis with Reflex Culture and Microscopic; Future    Depression, unspecified depression type  good  Orders:  •  POCT glycosylated hemoglobin (Hb A1C) manually resulted  •  TSH with reflex to Free T4 if abnormal; Future  •  Urinalysis with Reflex Culture and Microscopic; Future    Prediabetes  labs  Orders:  •  POCT glycosylated hemoglobin (Hb A1C) manually resulted  •  TSH with reflex to Free T4 if abnormal; Future  •  Urinalysis with Reflex Culture and Microscopic; Future    Cerumen debris on tympanic membrane of both ears  Clean out warm water   Orders:  •  POCT glycosylated hemoglobin (Hb A1C) manually resulted  •  TSH with reflex to Free T4 if abnormal; Future  •  Urinalysis with Reflex Culture and Microscopic; Future    Prostate cancer screening    Orders:  •  POCT glycosylated hemoglobin (Hb A1C) manually resulted  •  Prostate Specific Antigen, Screen; Future  •  TSH with reflex to Free T4 if abnormal; Future  •  Urinalysis with Reflex Culture and Microscopic; Future    Abnormal finding of blood chemistry, unspecified    Orders:  •  CBC;  Future    Hemiplegia affecting right dominant side, unspecified etiology, unspecified hemiplegia type (Multi)  resolved       Atrial fibrillation, unspecified type (Multi)  Stable       Alcohol dependence, uncomplicated (Multi)         Type 2 diabetes mellitus with other circulatory complications  labs

## 2025-01-31 NOTE — ASSESSMENT & PLAN NOTE
ok  Orders:    POCT glycosylated hemoglobin (Hb A1C) manually resulted    TSH with reflex to Free T4 if abnormal; Future    Urinalysis with Reflex Culture and Microscopic; Future

## 2025-02-02 LAB
ALBUMIN SERPL-MCNC: 4.4 G/DL (ref 3.6–5.1)
ALP SERPL-CCNC: 99 U/L (ref 35–144)
ALT SERPL-CCNC: 12 U/L (ref 9–46)
ANION GAP SERPL CALCULATED.4IONS-SCNC: 7 MMOL/L (CALC) (ref 7–17)
APPEARANCE UR: CLEAR
AST SERPL-CCNC: 17 U/L (ref 10–35)
BACTERIA #/AREA URNS HPF: ABNORMAL /HPF
BACTERIA UR CULT: ABNORMAL
BILIRUB SERPL-MCNC: 0.7 MG/DL (ref 0.2–1.2)
BILIRUB UR QL STRIP: NEGATIVE
BUN SERPL-MCNC: 18 MG/DL (ref 7–25)
CALCIUM SERPL-MCNC: 9.5 MG/DL (ref 8.6–10.3)
CHLORIDE SERPL-SCNC: 99 MMOL/L (ref 98–110)
CHOLEST SERPL-MCNC: 163 MG/DL
CHOLEST/HDLC SERPL: 2.3 (CALC)
CO2 SERPL-SCNC: 29 MMOL/L (ref 20–32)
COLOR UR: YELLOW
CREAT SERPL-MCNC: 1.19 MG/DL (ref 0.7–1.28)
EGFRCR SERPLBLD CKD-EPI 2021: 63 ML/MIN/1.73M2
ERYTHROCYTE [DISTWIDTH] IN BLOOD BY AUTOMATED COUNT: 12.5 % (ref 11–15)
GLUCOSE SERPL-MCNC: 90 MG/DL (ref 65–139)
GLUCOSE UR QL STRIP: NEGATIVE
HCT VFR BLD AUTO: 42.1 % (ref 38.5–50)
HDLC SERPL-MCNC: 71 MG/DL
HGB BLD-MCNC: 14.3 G/DL (ref 13.2–17.1)
HGB UR QL STRIP: NEGATIVE
HYALINE CASTS #/AREA URNS LPF: ABNORMAL /LPF
KETONES UR QL STRIP: NEGATIVE
LDLC SERPL CALC-MCNC: 74 MG/DL (CALC)
LEUKOCYTE ESTERASE UR QL STRIP: ABNORMAL
MCH RBC QN AUTO: 31 PG (ref 27–33)
MCHC RBC AUTO-ENTMCNC: 34 G/DL (ref 32–36)
MCV RBC AUTO: 91.1 FL (ref 80–100)
NITRITE UR QL STRIP: NEGATIVE
NONHDLC SERPL-MCNC: 92 MG/DL (CALC)
PH UR STRIP: 6 [PH] (ref 5–8)
PLATELET # BLD AUTO: 188 THOUSAND/UL (ref 140–400)
PMV BLD REES-ECKER: 10.5 FL (ref 7.5–12.5)
POTASSIUM SERPL-SCNC: 4.6 MMOL/L (ref 3.5–5.3)
PROT SERPL-MCNC: 6.8 G/DL (ref 6.1–8.1)
PROT UR QL STRIP: NEGATIVE
PSA SERPL-MCNC: 0.74 NG/ML
RBC # BLD AUTO: 4.62 MILLION/UL (ref 4.2–5.8)
RBC #/AREA URNS HPF: ABNORMAL /HPF
SERVICE CMNT-IMP: ABNORMAL
SODIUM SERPL-SCNC: 135 MMOL/L (ref 135–146)
SP GR UR STRIP: 1.01 (ref 1–1.03)
SQUAMOUS #/AREA URNS HPF: ABNORMAL /HPF
T4 FREE SERPL-MCNC: 1.3 NG/DL (ref 0.8–1.8)
TRIGL SERPL-MCNC: 98 MG/DL
TSH SERPL-ACNC: 4.58 MIU/L (ref 0.4–4.5)
WBC # BLD AUTO: 5.6 THOUSAND/UL (ref 3.8–10.8)
WBC #/AREA URNS HPF: ABNORMAL /HPF

## 2025-02-13 ENCOUNTER — TELEPHONE (OUTPATIENT)
Dept: PRIMARY CARE | Facility: CLINIC | Age: 80
End: 2025-02-13
Payer: MEDICARE

## 2025-03-09 DIAGNOSIS — G62.9 NEUROPATHY: ICD-10-CM

## 2025-03-10 RX ORDER — GABAPENTIN 300 MG/1
300 CAPSULE ORAL 2 TIMES DAILY
Qty: 180 CAPSULE | Refills: 0 | Status: SHIPPED | OUTPATIENT
Start: 2025-03-10

## 2025-06-05 DIAGNOSIS — G62.9 NEUROPATHY: ICD-10-CM

## 2025-06-06 RX ORDER — GABAPENTIN 300 MG/1
300 CAPSULE ORAL 2 TIMES DAILY
Qty: 180 CAPSULE | Refills: 0 | Status: SHIPPED | OUTPATIENT
Start: 2025-06-06

## 2025-07-14 ENCOUNTER — PATIENT OUTREACH (OUTPATIENT)
Dept: PRIMARY CARE | Facility: CLINIC | Age: 80
End: 2025-07-14
Payer: MEDICARE

## 2025-07-14 ENCOUNTER — DOCUMENTATION (OUTPATIENT)
Dept: PRIMARY CARE | Facility: CLINIC | Age: 80
End: 2025-07-14
Payer: MEDICARE

## 2025-07-14 NOTE — PROGRESS NOTES
Discharge Facility:[facility]  Discharge Diagnosis:Dizziness, Unsteady [place]  Admission Date:7/9/2025  Discharge Date: 7/11/2025    PCP Appointment Date:8/6/2025, for 6 mos, declined sooner  Specialist Appointment Date: TBD  Hospital Encounter and Summary Linked: Yes     Hospital Encounter with Aly Barr MD; Clyde Watson MD; Nigel Brooks MD     See discharge assessment below for further details  Wrap Up  Wrap Up Additional Comments: -- (Oscar states he is doing very well since returning home. No questions or concerns. He will contact provider if anything changes.) (7/14/2025  3:51 PM)    Engagement  Call Start Time: 1344 (7/14/2025  3:51 PM)    Medications  Medications reviewed with patient/caregiver?: Not applicable (No medication change) (7/14/2025  3:51 PM)  Is the patient having any side effects they believe may be caused by any medication additions or changes?: No (7/14/2025  3:51 PM)  Does the patient have all medications ordered at discharge?: Not applicable (7/14/2025  3:51 PM)  Care Management Interventions: No intervention needed (7/14/2025  3:51 PM)  Prescription Comments: -- (na) (7/14/2025  3:51 PM)  Is the patient taking all medications as directed (includes completed medication regime)?: Yes (7/14/2025  3:51 PM)  Medication Comments: -- (na) (7/14/2025  3:51 PM)    Appointments  Does the patient have a primary care provider?: Yes (7/14/2025  3:51 PM)  Care Management Interventions: Verified appointment date/time/provider (8/6/2025 for 6 mos declined sooner) (7/14/2025  3:51 PM)  Has the patient kept scheduled appointments due by today?: Yes (7/14/2025  3:51 PM)    Self Management  What is the home health agency?: -- (na) (7/14/2025  3:51 PM)  What Durable Medical Equipment (DME) was ordered?: -- (na) (7/14/2025  3:51 PM)    Patient Teaching  Does the patient have access to their discharge instructions?: Yes (7/14/2025  3:51 PM)  What is the patient's perception of their health status since  discharge?: Improving (7/14/2025  3:51 PM)  Is the patient/caregiver able to teach back the hierarchy of who to call/visit for symptoms/problems? PCP, Specialist, Home Health nurse, Urgent Care, ED, 911: Yes (7/14/2025  3:51 PM)

## 2025-07-28 ENCOUNTER — PATIENT OUTREACH (OUTPATIENT)
Dept: PRIMARY CARE | Facility: CLINIC | Age: 80
End: 2025-07-28
Payer: MEDICARE

## 2025-07-28 NOTE — PROGRESS NOTES
Confirmation of at least 2 patient identifiers.    Completed telephonic follow-up with patient approximately 14 days post discharge, no PCP follow up.   Spoke to patient during outreach call.    Patient reports feeling: Back to baseline    Patient has questions or concerns about medications: No    Have all prescribed medications been filled? Yes    Patient has necessary resources to manage their care? Yes    Patient has questions or concerns? No    Next care management follow-up approximately within one month.  Care  information provided to patient.  Oscar states he is doing fine, no questions.

## 2025-08-01 ENCOUNTER — APPOINTMENT (OUTPATIENT)
Dept: PRIMARY CARE | Facility: CLINIC | Age: 80
End: 2025-08-01
Payer: MEDICARE

## 2025-08-06 ENCOUNTER — APPOINTMENT (OUTPATIENT)
Dept: PRIMARY CARE | Facility: CLINIC | Age: 80
End: 2025-08-06
Payer: MEDICARE

## 2025-08-06 VITALS
OXYGEN SATURATION: 98 % | BODY MASS INDEX: 28.64 KG/M2 | TEMPERATURE: 97.7 F | HEART RATE: 64 BPM | DIASTOLIC BLOOD PRESSURE: 84 MMHG | WEIGHT: 178.2 LBS | HEIGHT: 66 IN | SYSTOLIC BLOOD PRESSURE: 140 MMHG

## 2025-08-06 DIAGNOSIS — I45.9 HEART BLOCK: ICD-10-CM

## 2025-08-06 DIAGNOSIS — Z98.890 STATUS POST CAROTID ENDARTERECTOMY: ICD-10-CM

## 2025-08-06 DIAGNOSIS — E86.0 DEHYDRATION: Primary | ICD-10-CM

## 2025-08-06 DIAGNOSIS — I10 PRIMARY HYPERTENSION: ICD-10-CM

## 2025-08-06 DIAGNOSIS — H61.23 BILATERAL IMPACTED CERUMEN: ICD-10-CM

## 2025-08-06 DIAGNOSIS — F10.10 ALCOHOL ABUSE: ICD-10-CM

## 2025-08-06 ASSESSMENT — PATIENT HEALTH QUESTIONNAIRE - PHQ9
1. LITTLE INTEREST OR PLEASURE IN DOING THINGS: SEVERAL DAYS
6. FEELING BAD ABOUT YOURSELF - OR THAT YOU ARE A FAILURE OR HAVE LET YOURSELF OR YOUR FAMILY DOWN: NOT AT ALL
5. POOR APPETITE OR OVEREATING: SEVERAL DAYS
7. TROUBLE CONCENTRATING ON THINGS, SUCH AS READING THE NEWSPAPER OR WATCHING TELEVISION: NOT AT ALL
2. FEELING DOWN, DEPRESSED OR HOPELESS: SEVERAL DAYS
3. TROUBLE FALLING OR STAYING ASLEEP OR SLEEPING TOO MUCH: SEVERAL DAYS
SUM OF ALL RESPONSES TO PHQ QUESTIONS 1-9: 5
4. FEELING TIRED OR HAVING LITTLE ENERGY: SEVERAL DAYS
8. MOVING OR SPEAKING SO SLOWLY THAT OTHER PEOPLE COULD HAVE NOTICED. OR THE OPPOSITE, BEING SO FIGETY OR RESTLESS THAT YOU HAVE BEEN MOVING AROUND A LOT MORE THAN USUAL: NOT AT ALL
SUM OF ALL RESPONSES TO PHQ9 QUESTIONS 1 AND 2: 2
9. THOUGHTS THAT YOU WOULD BE BETTER OFF DEAD, OR OF HURTING YOURSELF: NOT AT ALL

## 2025-08-06 NOTE — PROGRESS NOTES
"Subjective   Patient ID: Oscar Lomeli is a 80 y.o. male who presents for Follow-up (6 MO).    HPI ACTIVE ETOH ABUSE   HE SAYS HE IS SLOWING DOWN   HE SAYS HE IS DRINKING MORE WATER   \SAW CARDS     Past Medical History:  Diagnosis Date   Abnormal CT scan of head   Arrhythmia   cardiac arrhythmia   Bradycardia 2021   implanted PPM   COPD (chronic obstructive pulmonary disease) (HCC)   Diabetes mellitus (HCC)   Disease of thyroid gland   Epilepsy (HCC)   Foramen ovale   Hypertension   Memory change   Mobitz type 2 second degree heart block   Stroke (HCC)   Valvular disease     Review of Systems    Objective   /84 (BP Location: Left arm, Patient Position: Sitting, BP Cuff Size: Adult)   Pulse 64   Temp 36.5 °C (97.7 °F) (Temporal)   Ht 1.676 m (5' 6\")   Wt 80.8 kg (178 lb 3.2 oz)   SpO2 98%   BMI 28.76 kg/m²     Physical Exam  NAD  CARD RRR  PULM CTA  ABD NEG   EXT  NL    Assessment/Plan   Diagnoses and all orders for this visit:  Dehydration  Comments:  RESOLVED  Status post carotid endarterectomy  Comments:  SP LEFT REPAIR  Alcohol abuse  Comments:  ONGOING  Primary hypertension  Comments:  OK  Heart block  Comments:  SEE CARDS         "

## 2025-08-06 NOTE — PROGRESS NOTES
Ear Cerumen Removal    Date/Time: 8/6/2025 11:33 AM    Performed by: Haven Roldan  Authorized by: Efrain Stark MD    Consent:     Consent obtained:  Verbal    Consent given by:  Patient    Risks, benefits, and alternatives were discussed: yes      Risks discussed:  Pain, dizziness and incomplete removal    Alternatives discussed:  Alternative treatment  Universal protocol:     Patient identity confirmed:  Verbally with patient  Procedure details:     Location:  L ear and R ear    Procedure type: irrigation      Procedure outcomes: unable to remove cerumen    Post-procedure details:     Inspection:  Some cerumen remaining    Hearing quality:  Normal    Procedure completion:  Tolerated  Comments:      Right ear cerumen removed.  Left ear had some remaining.  Patient was educated on Debrox use and to return for ear cleaning.

## 2025-08-28 ENCOUNTER — PATIENT OUTREACH (OUTPATIENT)
Dept: PRIMARY CARE | Facility: CLINIC | Age: 80
End: 2025-08-28
Payer: MEDICARE

## 2025-09-04 DIAGNOSIS — G62.9 NEUROPATHY: ICD-10-CM

## 2025-09-04 RX ORDER — GABAPENTIN 300 MG/1
300 CAPSULE ORAL 2 TIMES DAILY
Qty: 180 CAPSULE | Refills: 0 | Status: SHIPPED | OUTPATIENT
Start: 2025-09-04

## 2026-02-06 ENCOUNTER — APPOINTMENT (OUTPATIENT)
Dept: PRIMARY CARE | Facility: CLINIC | Age: 81
End: 2026-02-06
Payer: MEDICARE